# Patient Record
Sex: FEMALE | Race: WHITE | NOT HISPANIC OR LATINO | Employment: UNEMPLOYED | URBAN - METROPOLITAN AREA
[De-identification: names, ages, dates, MRNs, and addresses within clinical notes are randomized per-mention and may not be internally consistent; named-entity substitution may affect disease eponyms.]

---

## 2018-07-23 ENCOUNTER — HOSPITAL ENCOUNTER (EMERGENCY)
Facility: HOSPITAL | Age: 43
Discharge: HOME | End: 2018-07-23
Attending: INTERNAL MEDICINE
Payer: MEDICAID

## 2018-07-23 VITALS
SYSTOLIC BLOOD PRESSURE: 99 MMHG | OXYGEN SATURATION: 100 % | DIASTOLIC BLOOD PRESSURE: 70 MMHG | WEIGHT: 137.38 LBS | RESPIRATION RATE: 16 BRPM | HEART RATE: 74 BPM | TEMPERATURE: 99 F

## 2018-07-23 DIAGNOSIS — M67.432 GANGLION CYST OF WRIST, LEFT: Primary | ICD-10-CM

## 2018-07-23 LAB
B-HCG UR QL: NEGATIVE
CTP QC/QA: YES

## 2018-07-23 PROCEDURE — 81025 URINE PREGNANCY TEST: CPT | Performed by: INTERNAL MEDICINE

## 2018-07-23 PROCEDURE — 99283 EMERGENCY DEPT VISIT LOW MDM: CPT

## 2018-07-23 RX ORDER — IBUPROFEN 800 MG/1
800 TABLET ORAL EVERY 8 HOURS PRN
Qty: 30 TABLET | Refills: 0 | Status: SHIPPED | OUTPATIENT
Start: 2018-07-23 | End: 2018-07-23

## 2018-07-23 RX ORDER — IBUPROFEN 800 MG/1
800 TABLET ORAL EVERY 8 HOURS PRN
Qty: 30 TABLET | Refills: 0 | OUTPATIENT
Start: 2018-07-23 | End: 2022-04-15

## 2018-07-24 NOTE — ED PROVIDER NOTES
Encounter Date: 7/23/2018    SCRIBE #1 NOTE: I, Presley De La Torre, am scribing for, and in the presence of,  Dr. Samson . I have scribed the entire note.       History     Chief Complaint   Patient presents with    Wrist Pain     Pt reports left wrist pain for the past few days, states she noticed a bump on her wrist and has pain, denies trauma, hx CTS     Time patient was seen by the provider: 8:34 PM      The patient is a 43 y.o. female with co-morbidities including: carpal tunnel syndrome who presents to the ED with a complaint of wrist pain due to a bump on her left wrist for 3 days.  She notes that she works as a  and denies any trauma to the area of the bump. She rates the pain of this bump as a 5/10.           The history is provided by the patient.     Review of patient's allergies indicates:   Allergen Reactions    Codeine Shortness Of Breath    Penicillins Shortness Of Breath    Latex, natural rubber Rash     Past Medical History:   Diagnosis Date    Fibroids      History reviewed. No pertinent surgical history.  History reviewed. No pertinent family history.  Social History   Substance Use Topics    Smoking status: Current Every Day Smoker    Smokeless tobacco: Never Used    Alcohol use Yes     Review of Systems   Musculoskeletal: Positive for arthralgias.   Skin: Positive for wound (Bump to her left wrist ).   All other systems reviewed and are negative.      Physical Exam     Initial Vitals [07/23/18 1901]   BP Pulse Resp Temp SpO2   125/65 78 16 98.3 °F (36.8 °C) 100 %      MAP       --         Physical Exam    Nursing note and vitals reviewed.  Constitutional: She appears well-developed and well-nourished.   HENT:   Head: Normocephalic and atraumatic.   Nose: Nose normal.   Eyes: Conjunctivae are normal.   Neck: Normal range of motion. Neck supple.   Cardiovascular: Normal rate and regular rhythm. Exam reveals no gallop and no friction rub.    No murmur heard.  Pulmonary/Chest: No  respiratory distress. She has no wheezes.   Abdominal: She exhibits no distension.   Musculoskeletal: Normal range of motion.   Neurological: She is alert and oriented to person, place, and time.   Skin: Skin is warm and dry.   1 cm diameter cyst to the left lateral wrist.   Nontender to palpation   No erythema.    Psychiatric: She has a normal mood and affect. Her behavior is normal.         ED Course   Procedures  Labs Reviewed   POCT URINE PREGNANCY          Imaging Results    None          Medical Decision Making:   Initial Assessment:   Time patient was seen by the provider: 8:34 PM      The patient is a 43 y.o. female with co-morbidities including: carpal tunnel syndrome who presents to the ED with a complaint of wrist pain due to a bump on her left wrist for 3 days.  She notes that she works as a  and denies any trauma to the area of the bump. She rates the pain of this bump as a 5/10.       Clinical Tests:   Lab Tests: Ordered and Reviewed  ED Management:  Patient was given instructions for ganglion cyst and a prescription for ibuprofen.  She was advised to follow-up with her primary care physician within the next week for reevaluation and to return to the emergency department if condition worsens.            Scribe Attestation:   Scribe #1: I performed the above scribed service and the documentation accurately describes the services I performed. I attest to the accuracy of the note.        This document was produced by a scribe under my direction and in my presence. I agree with the content of the note and have made any necessary edits.     Dr. Samson    07/23/2018 8:47 PM          Clinical Impression:     1. Ganglion cyst of wrist, left            Disposition:   Disposition: Discharged  Condition: Stable                        Jim Samson MD  07/23/18 2050

## 2019-07-10 ENCOUNTER — HOSPITAL ENCOUNTER (EMERGENCY)
Facility: HOSPITAL | Age: 44
Discharge: HOME | End: 2019-07-10
Attending: EMERGENCY MEDICINE
Payer: MEDICAID

## 2019-07-10 VITALS
HEIGHT: 66 IN | BODY MASS INDEX: 23.3 KG/M2 | DIASTOLIC BLOOD PRESSURE: 66 MMHG | RESPIRATION RATE: 17 BRPM | WEIGHT: 145 LBS | TEMPERATURE: 99 F | OXYGEN SATURATION: 97 % | SYSTOLIC BLOOD PRESSURE: 105 MMHG | HEART RATE: 81 BPM

## 2019-07-10 DIAGNOSIS — K08.89 PAIN, DENTAL: Primary | ICD-10-CM

## 2019-07-10 DIAGNOSIS — K04.01 ACUTE PULPITIS: ICD-10-CM

## 2019-07-10 LAB
B-HCG UR QL: NEGATIVE
CTP QC/QA: YES

## 2019-07-10 PROCEDURE — 81025 URINE PREGNANCY TEST: CPT | Performed by: NURSE PRACTITIONER

## 2019-07-10 PROCEDURE — 99283 EMERGENCY DEPT VISIT LOW MDM: CPT

## 2019-07-10 PROCEDURE — 25000003 PHARM REV CODE 250: Performed by: NURSE PRACTITIONER

## 2019-07-10 RX ORDER — CLINDAMYCIN HYDROCHLORIDE 150 MG/1
450 CAPSULE ORAL EVERY 8 HOURS
Qty: 90 CAPSULE | Refills: 0 | Status: SHIPPED | OUTPATIENT
Start: 2019-07-10 | End: 2019-07-20

## 2019-07-10 RX ORDER — CLINDAMYCIN HYDROCHLORIDE 150 MG/1
450 CAPSULE ORAL
Status: COMPLETED | OUTPATIENT
Start: 2019-07-10 | End: 2019-07-10

## 2019-07-10 RX ADMIN — CLINDAMYCIN HYDROCHLORIDE 450 MG: 150 CAPSULE ORAL at 04:07

## 2019-07-10 NOTE — DISCHARGE INSTRUCTIONS
It appears you have a dental infection.    Please take CLINDAMYCIN for the infection for 10 days.    Please follow up with a dentist within 1 week. Call for an appointment as soon as possible.  If you do not have a dentist, refer to the dental resources page for nearby clinics.    Return to the ER for any new or worsening symptoms or concerns.

## 2019-07-10 NOTE — ED PROVIDER NOTES
Encounter Date: 7/10/2019    SCRIBE #1 NOTE: I, Praveena Bolton, am scribing for, and in the presence of,  Ita Son NP. I have scribed the following portions of the note - Other sections scribed: HPI,ROS,PE.       History     Chief Complaint   Patient presents with    Dental Problem     Pt reports she has an abscess on the bottom R side of her jaw. Pt states she has a broken tooth at that site.      CC: Dental pain    HPI: This is a 44 y.o.female patient, with no pertinent PMHx , presenting to the ED with a complaint of right sided dental pain, that began x4 days ago. Patient reports feeling swelling this morning when she woke up. Patient states having multiple cracked teeth to her right lower mandible for months that began to bother her x3 days ago. She reports attempting prior Tx with over the counter pain medication,  warm compresses, salt water rinses, with relief. Patient denies any choking, dyspahgia, fever, chills, shortness of breath, chest pain, neck pain, back pain, abdominal pain, headaches, cough, sore throat, nausea, vomiting, diarrhea, dysuria, or any other associated symptoms. No alleviating or aggravating factors. No known drug allergies.    The history is provided by the patient. No  was used.     Review of patient's allergies indicates:   Allergen Reactions    Codeine Shortness Of Breath and Hives    Iodine Shortness Of Breath    Penicillins Shortness Of Breath and Anaphylaxis    Shellfish containing products Shortness Of Breath and Swelling    Latex Rash    Latex, natural rubber Rash     Past Medical History:   Diagnosis Date    Fibroids      Past Surgical History:   Procedure Laterality Date    TONSILLECTOMY       History reviewed. No pertinent family history.  Social History     Tobacco Use    Smoking status: Former Smoker     Types: Cigarettes     Last attempt to quit: 2/10/2019     Years since quittin.4    Smokeless tobacco: Never Used   Substance Use Topics     Alcohol use: Not Currently    Drug use: No     Review of Systems   Constitutional: Negative for chills and fever.   HENT: Positive for dental problem. Negative for congestion, ear pain, rhinorrhea, sore throat and trouble swallowing.    Eyes: Negative for pain and visual disturbance.   Respiratory: Negative for cough and shortness of breath.    Cardiovascular: Negative for chest pain.   Gastrointestinal: Negative for abdominal pain, diarrhea, nausea and vomiting.   Genitourinary: Negative for dysuria.   Musculoskeletal: Negative for back pain and neck pain.   Skin: Negative for rash.   Neurological: Negative for headaches.       Physical Exam     Initial Vitals [07/10/19 1545]   BP Pulse Resp Temp SpO2   117/73 90 18 98.6 °F (37 °C) 100 %      MAP       --         Physical Exam    Constitutional: Vital signs are normal. She appears well-developed and well-nourished. She is not diaphoretic. She is active and cooperative.  Non-toxic appearance. No distress.   HENT:   Head: Normocephalic and atraumatic.   Nose: Nose normal.   Mouth/Throat: Uvula is midline, oropharynx is clear and moist and mucous membranes are normal. No trismus in the jaw. Abnormal dentition. No uvula swelling.       Molars 31 and 32 are cracked and eroded to gumline.  Premolar 30 has a partial crack and is tender to percussion with underlying gingival swelling and erythema. Mild mandibular swelling appreciated.  No sublingual swelling, facial cellulitis, fluctuant abscess, trismus, drooling.   Eyes: EOM are normal. Pupils are equal, round, and reactive to light.   Neck: Normal range of motion and full passive range of motion without pain. Neck supple.   Cardiovascular: Normal rate, regular rhythm and normal heart sounds. Exam reveals no gallop and no friction rub.    No murmur heard.  Pulmonary/Chest: Breath sounds normal. No respiratory distress. She has no wheezes. She has no rhonchi. She has no rales.   Abdominal: Soft. Bowel sounds are  normal. There is no tenderness. There is no rebound and no guarding.   Musculoskeletal: Normal range of motion.   Neurological: She is alert and oriented to person, place, and time. She has normal strength. No cranial nerve deficit or sensory deficit.   Skin: Skin is warm and dry. Capillary refill takes less than 2 seconds.   Psychiatric: She has a normal mood and affect.         ED Course   Procedures  Labs Reviewed   POCT URINE PREGNANCY          Imaging Results    None          Medical Decision Making:   Differential Diagnosis:   Dental infection, cracked tooth, pulpitis  ED Management:  This is an urgent evaluation of a 43 y/o female that presents to the ED with dental pain.  SHe appears to have pulpitis, possibly a periapical abscess/dental infection.  Pt is afebrile, non-toxic in appearance; no signs of Lucas's angina, airway compromise, facial cellulitis/swelling, sepsis, dehydration, or a fluctuant abscess to drain.  Will discharge with Clindamycin (PCN allergy).  Given return precautions and instructed to follow up with a dentist within a week.                        Clinical Impression:       ICD-10-CM ICD-9-CM   1. Pain, dental K08.89 525.9   2. Acute pulpitis K04.01 522.0         Disposition:   Disposition: Discharged  Condition: Stable           Scribe attestation: I, Ita Ramírez, personally performed the services described in this documentation. All medical record entries made by the scribe were at my direction and in my presence.  I have reviewed the chart and agree that the record reflects my personal performance and is accurate and complete.             Ita Ramírez NP  07/10/19 4418

## 2019-07-10 NOTE — ED TRIAGE NOTES
Pt reports abscess to R bottom jaw since Monday night. Pt reports using oral gel and salt gargle with some relief. Pt c/o of throbbing to R side of face. Pt denies radiation of pain.

## 2022-04-10 ENCOUNTER — HOSPITAL ENCOUNTER (EMERGENCY)
Facility: HOSPITAL | Age: 47
Discharge: HOME | End: 2022-04-11
Attending: EMERGENCY MEDICINE
Payer: MEDICAID

## 2022-04-10 DIAGNOSIS — R53.83 FATIGUE, UNSPECIFIED TYPE: Primary | ICD-10-CM

## 2022-04-10 DIAGNOSIS — D50.0 IRON DEFICIENCY ANEMIA DUE TO CHRONIC BLOOD LOSS: ICD-10-CM

## 2022-04-10 DIAGNOSIS — M79.652 LEFT THIGH PAIN: ICD-10-CM

## 2022-04-10 LAB
ALBUMIN SERPL-MCNC: 4.3 G/DL (ref 3.3–5.5)
ALP SERPL-CCNC: 62 U/L (ref 42–141)
B-HCG UR QL: NEGATIVE
BILIRUB SERPL-MCNC: 0.6 MG/DL (ref 0.2–1.6)
BILIRUBIN, POC UA: NEGATIVE
BLOOD, POC UA: ABNORMAL
BUN SERPL-MCNC: 7 MG/DL (ref 7–22)
CALCIUM SERPL-MCNC: 10.5 MG/DL (ref 8–10.3)
CHLORIDE SERPL-SCNC: 101 MMOL/L (ref 98–108)
CLARITY, POC UA: ABNORMAL
COLOR, POC UA: ABNORMAL
CREAT SERPL-MCNC: 0.6 MG/DL (ref 0.6–1.2)
CTP QC/QA: YES
GLUCOSE SERPL-MCNC: 113 MG/DL (ref 73–118)
GLUCOSE, POC UA: NEGATIVE
KETONES, POC UA: NEGATIVE
LEUKOCYTE EST, POC UA: ABNORMAL
NITRITE, POC UA: NEGATIVE
PH UR STRIP: 6.5 [PH]
POC ALT (SGPT): 14 U/L (ref 10–47)
POC AST (SGOT): 23 U/L (ref 11–38)
POC TCO2: 27 MMOL/L (ref 18–33)
POTASSIUM BLD-SCNC: 3.6 MMOL/L (ref 3.6–5.1)
PROTEIN, POC UA: NEGATIVE
PROTEIN, POC: 8 G/DL (ref 6.4–8.1)
SODIUM BLD-SCNC: 142 MMOL/L (ref 128–145)
SPECIFIC GRAVITY, POC UA: 1.01
UROBILINOGEN, POC UA: 0.2 E.U./DL

## 2022-04-10 PROCEDURE — 85025 COMPLETE CBC W/AUTO DIFF WBC: CPT | Mod: ER

## 2022-04-10 PROCEDURE — 25000003 PHARM REV CODE 250: Mod: ER | Performed by: NURSE PRACTITIONER

## 2022-04-10 PROCEDURE — 96360 HYDRATION IV INFUSION INIT: CPT | Mod: ER

## 2022-04-10 PROCEDURE — 81003 URINALYSIS AUTO W/O SCOPE: CPT | Mod: ER

## 2022-04-10 PROCEDURE — 80053 COMPREHEN METABOLIC PANEL: CPT | Mod: ER

## 2022-04-10 PROCEDURE — 99285 EMERGENCY DEPT VISIT HI MDM: CPT | Mod: 25,ER

## 2022-04-10 PROCEDURE — 81025 URINE PREGNANCY TEST: CPT | Mod: ER | Performed by: STUDENT IN AN ORGANIZED HEALTH CARE EDUCATION/TRAINING PROGRAM

## 2022-04-10 RX ORDER — DOCUSATE SODIUM 100 MG/1
100 CAPSULE, LIQUID FILLED ORAL 2 TIMES DAILY
Qty: 60 CAPSULE | Refills: 0 | Status: ON HOLD | OUTPATIENT
Start: 2022-04-10 | End: 2022-07-05 | Stop reason: HOSPADM

## 2022-04-10 RX ADMIN — SODIUM CHLORIDE 1000 ML: 0.9 INJECTION, SOLUTION INTRAVENOUS at 09:04

## 2022-04-11 VITALS
OXYGEN SATURATION: 100 % | HEART RATE: 77 BPM | TEMPERATURE: 98 F | RESPIRATION RATE: 16 BRPM | WEIGHT: 144 LBS | BODY MASS INDEX: 23.24 KG/M2 | DIASTOLIC BLOOD PRESSURE: 66 MMHG | SYSTOLIC BLOOD PRESSURE: 135 MMHG

## 2022-04-11 RX ORDER — METHOCARBAMOL 750 MG/1
1500 TABLET, FILM COATED ORAL EVERY 6 HOURS
Qty: 24 TABLET | Refills: 0 | Status: SHIPPED | OUTPATIENT
Start: 2022-04-11 | End: 2022-04-14

## 2022-04-11 NOTE — DISCHARGE INSTRUCTIONS
Continue iron supplements as previously prescribed.  Drink plenty of fluids.  Limit/avoid caffeine and alcohol intake while symptoms persist.

## 2022-04-11 NOTE — ED PROVIDER NOTES
"Encounter Date: 4/10/2022    SCRIBE #1 NOTE: I, Rosemarie Sanchez, am scribing for, and in the presence of,  Ry Sampson MD. I have scribed the following portions of the note - Other sections scribed: HPI; ROS; PE.       History     Chief Complaint   Patient presents with    Weakness     Pt states," I am lite headed, I was at [facility] and they said I had anemia."     Milagro Gomez is a 46 y.o. female with Anemia, who presents to the ED for chief complaint of chronic lightheadedness and fatigue onset 1 month ago and chronic, constant left leg pain onset 3 weeks ago. Patient reports she is "severely anemic," and "can't function right." She states she is taking iron supplements two times daily. Patient notes started her menstrual cycle on 04/03 and is still currently bleeding, but she normally bleeds for 7 days. She reports she changes her pad 2-3 times a day. Patient states she had an appointment with her primary care physician on 04/07 for her anemia. She reports previous issues with diarrhea, but has since stopped due to the iron supplements. She notes her last bowel movement was today. Patient reports she has a history of back pain. She denies any recent injury or accident to the left lower extremity. Patient notes she is short of breath at night. She denies vomiting, chest pain, or hematochezia. She reports tobacco abuse.      The history is provided by the patient. No  was used.     Review of patient's allergies indicates:   Allergen Reactions    Codeine Shortness Of Breath and Hives    Iodine Shortness Of Breath    Penicillins Shortness Of Breath and Anaphylaxis    Shellfish containing products Shortness Of Breath and Swelling    Latex Rash    Latex, natural rubber Rash     Past Medical History:   Diagnosis Date    Fibroids      Past Surgical History:   Procedure Laterality Date    TONSILLECTOMY       No family history on file.  Social History     Tobacco Use    Smoking " status: Former Smoker     Types: Cigarettes     Quit date: 2/10/2019     Years since quitting: 3.2    Smokeless tobacco: Never Used   Substance Use Topics    Alcohol use: Not Currently    Drug use: No     Review of Systems   Constitutional: Positive for fatigue.   Respiratory: Positive for shortness of breath.    Cardiovascular: Negative for chest pain.   Gastrointestinal: Negative for blood in stool and vomiting.   Genitourinary: Negative for menstrual problem.   Musculoskeletal: Positive for myalgias.   Neurological: Positive for light-headedness. Negative for syncope.   All other systems reviewed and are negative.      Physical Exam     Initial Vitals [04/10/22 1843]   BP Pulse Resp Temp SpO2   (!) 155/91 88 16 98.4 °F (36.9 °C) 100 %      MAP       --         Physical Exam    Nursing note and vitals reviewed.  Constitutional: She appears well-developed and well-nourished. She is not diaphoretic. She is cooperative. She does not have a sickly appearance. No distress.   HENT:   Head: Normocephalic and atraumatic.   Eyes: Conjunctivae are normal. Pupils are equal, round, and reactive to light.   Neck: Neck supple.   Normal range of motion.  Cardiovascular: Normal rate, regular rhythm and intact distal pulses.   Pulses:       Dorsalis pedis pulses are 2+ on the right side and 2+ on the left side.   Pulmonary/Chest: Effort normal and breath sounds normal. No accessory muscle usage. No tachypnea. No respiratory distress.   Abdominal: Abdomen is soft. She exhibits no distension. There is no abdominal tenderness. There is no rebound and no guarding.   Musculoskeletal:         General: No tenderness. Normal range of motion.      Cervical back: Normal range of motion and neck supple.     Neurological: She is alert and oriented to person, place, and time. She has normal strength. Gait normal. GCS eye subscore is 4. GCS verbal subscore is 5. GCS motor subscore is 6.   Skin: Skin is warm. Capillary refill takes less than  2 seconds.   Psychiatric: Her affect is blunt. She exhibits a depressed mood.         ED Course   Procedures  Labs Reviewed   POCT URINALYSIS W/O SCOPE - Abnormal; Notable for the following components:       Result Value    Blood, UA 3+ (*)     Leukocytes, UA Trace (*)     All other components within normal limits   POCT CMP - Abnormal; Notable for the following components:    Calcium, POC 10.5 (*)     All other components within normal limits   POCT URINE PREGNANCY   POCT CBC   POCT URINALYSIS W/O SCOPE   POCT CMP          Imaging Results          US Lower Extremity Veins Left (Final result)  Result time 04/11/22 00:05:50    Final result by Roland Marks MD (04/11/22 00:05:50)                 Impression:      No evidence of deep venous thrombosis in the left lower extremity.      Electronically signed by: Roland Marks  Date:    04/11/2022  Time:    00:05             Narrative:    EXAMINATION:  US LOWER EXTREMITY VEINS LEFT    CLINICAL HISTORY:  Pain in left thigh    TECHNIQUE:  Duplex and color flow Doppler evaluation and graded compression of the left lower extremity veins was performed.    COMPARISON:  None    FINDINGS:  Left thigh veins: The common femoral, femoral, popliteal, upper greater saphenous, and deep femoral veins are patent and free of thrombus. The veins are normally compressible and have normal phasic flow and augmentation response.    Left calf veins: The visualized calf veins are patent.    Contralateral CFV: The contralateral (right) common femoral vein is patent and free of thrombus.    Miscellaneous: None                                 Medications   sodium chloride 0.9% bolus 1,000 mL (0 mLs Intravenous Stopped 4/10/22 5654)     Labs Reviewed            Admission on 04/10/2022, Discharged on 04/11/2022   Component Date Value Ref Range Status    POC Preg Test, Ur 04/10/2022 Negative  Negative Final     Acceptable 04/10/2022 Yes   Final    Albumin, POC 04/10/2022 4.3   3.3 - 5.5 g/dL Final    Alkaline Phosphatase, POC 04/10/2022 62  42 - 141 U/L Final    ALT (SGPT), POC 04/10/2022 14  10 - 47 U/L Final    AST (SGOT), POC 04/10/2022 23  11 - 38 U/L Final    POC BUN 04/10/2022 7  7 - 22 mg/dL Final    Calcium, POC 04/10/2022 10.5 (A) 8.0 - 10.3 mg/dL Final    POC Chloride 04/10/2022 101  98 - 108 mmol/L Final    POC Creatinine 04/10/2022 0.6  0.6 - 1.2 mg/dL Final    POC Glucose 04/10/2022 113  73 - 118 mg/dL Final    POC Potassium 04/10/2022 3.6  3.6 - 5.1 mmol/L Final    POC Sodium 04/10/2022 142  128 - 145 mmol/L Final    Bilirubin, POC 04/10/2022 0.6  0.2 - 1.6 mg/dL Final    POC TCO2 04/10/2022 27  18 - 33 mmol/L Final    Protein, POC 04/10/2022 8.0  6.4 - 8.1 g/dL Final    Glucose, UA 04/10/2022 Negative   Final    Bilirubin, UA 04/10/2022 Negative   Final    Ketones, UA 04/10/2022 Negative   Final    Spec Grav UA 04/10/2022 1.010   Final    Blood, UA 04/10/2022 3+ (A)  Final    PH, UA 04/10/2022 6.5   Final    Protein, UA 04/10/2022 Negative   Final    Urobilinogen, UA 04/10/2022 0.2  E.U./dL Final    Nitrite, UA 04/10/2022 Negative   Final    Leukocytes, UA 04/10/2022 Trace (A)  Final    Color, UA 04/10/2022 Pink   Final    Clarity, UA 04/10/2022 Slightly Cloudy   Final        Imaging Reviewed    Imaging Results          US Lower Extremity Veins Left (Final result)  Result time 04/11/22 00:05:50    Final result by Roland Marks MD (04/11/22 00:05:50)                 Impression:      No evidence of deep venous thrombosis in the left lower extremity.      Electronically signed by: Roland Marks  Date:    04/11/2022  Time:    00:05             Narrative:    EXAMINATION:  US LOWER EXTREMITY VEINS LEFT    CLINICAL HISTORY:  Pain in left thigh    TECHNIQUE:  Duplex and color flow Doppler evaluation and graded compression of the left lower extremity veins was performed.    COMPARISON:  None    FINDINGS:  Left thigh veins: The common femoral, femoral,  popliteal, upper greater saphenous, and deep femoral veins are patent and free of thrombus. The veins are normally compressible and have normal phasic flow and augmentation response.    Left calf veins: The visualized calf veins are patent.    Contralateral CFV: The contralateral (right) common femoral vein is patent and free of thrombus.    Miscellaneous: None                                Medications given in ED    Medications   sodium chloride 0.9% bolus 1,000 mL (0 mLs Intravenous Stopped 4/10/22 0604)         Note was created using voice recognition software. Note may have occasional typographical errors that may not have been identified and edited despite good shen initial review prior to signing.    I, Ry Sampson MD, personally performed the services described in this documentation. All medical record entries made by the scribe were at my direction and in my presence.  I have reviewed the chart and agree that the record reflects my personal performance and is accurate and complete.      Medical Decision Making:   History:   Old Medical Records: I decided to obtain old medical records.  Clinical Tests:   Lab Tests: Ordered and Reviewed  Radiological Study: Ordered and Reviewed          Scribe Attestation:   Scribe #1: I performed the above scribed service and the documentation accurately describes the services I performed. I attest to the accuracy of the note.        ED Course as of 05/07/22 0245   Sun Apr 10, 2022   2153 H/H from 4/8/22 8.2/30.5 [DL]      ED Course User Index  [DL] Ry Sampson MD             Clinical Impression:   Final diagnoses:  [R53.83] Fatigue, unspecified type (Primary)  [D50.0] Iron deficiency anemia due to chronic blood loss  [M79.652] Left thigh pain          ED Disposition Condition    Discharge Stable        ED Prescriptions     Medication Sig Dispense Start Date End Date Auth. Provider    docusate sodium (COLACE) 100 MG capsule Take 1 capsule (100 mg total) by mouth 2  (two) times daily. Take with each dose of iron to prevent constipation 60 capsule 4/10/2022  Ry Sampson MD    methocarbamoL (ROBAXIN) 750 MG Tab () Take 2 tablets (1,500 mg total) by mouth every 6 (six) hours. for 3 days 24 tablet 2022 Ry Sampson MD        Follow-up Information     Follow up With Specialties Details Why Contact Info    Rickie Causey MD General Practice Call  As needed, for ongoing care [address] [facility] [address] [phone]      The nearest emergency department.  Go to  As needed, If symptoms worsen            Ry Sampson MD  [phone]

## 2022-04-11 NOTE — FIRST PROVIDER EVALUATION
Medical screening exam completed.  I have conducted a focused provider triage encounter, findings are as follows:    Brief history of present illness:  Anemia, increased weakness, lightheadedness; also has a UTI on Bactrim    Vitals:    04/10/22 1843   BP: (!) 155/91   BP Location: Right arm   Patient Position: Sitting   Pulse: 88   Resp: 16   Temp: 98.4 °F (36.9 °C)   TempSrc: Oral   SpO2: 100%   Weight: 65.3 kg (144 lb)       Pertinent physical exam: NAD    Brief workup plan:  Labs, orthostatics    Preliminary workup initiated; this workup will be continued and followed by the physician or advanced practice provider that is assigned to the patient when roomed.

## 2022-04-14 ENCOUNTER — HOSPITAL ENCOUNTER (EMERGENCY)
Facility: HOSPITAL | Age: 47
Discharge: HOME | End: 2022-04-14
Attending: EMERGENCY MEDICINE
Payer: MEDICAID

## 2022-04-14 VITALS
OXYGEN SATURATION: 100 % | RESPIRATION RATE: 19 BRPM | HEIGHT: 66 IN | BODY MASS INDEX: 23.3 KG/M2 | HEART RATE: 81 BPM | SYSTOLIC BLOOD PRESSURE: 123 MMHG | TEMPERATURE: 99 F | DIASTOLIC BLOOD PRESSURE: 64 MMHG | WEIGHT: 145 LBS

## 2022-04-14 DIAGNOSIS — D64.9 ANEMIA, UNSPECIFIED TYPE: ICD-10-CM

## 2022-04-14 DIAGNOSIS — R42 ORTHOSTATIC DIZZINESS: Primary | ICD-10-CM

## 2022-04-14 DIAGNOSIS — R53.83 FATIGUE: ICD-10-CM

## 2022-04-14 LAB
ALBUMIN SERPL BCP-MCNC: 3.9 G/DL (ref 3.5–5.2)
ALP SERPL-CCNC: 51 U/L (ref 55–135)
ALT SERPL W/O P-5'-P-CCNC: 8 U/L (ref 10–44)
ANION GAP SERPL CALC-SCNC: 8 MMOL/L (ref 8–16)
AST SERPL-CCNC: 12 U/L (ref 10–40)
BACTERIA #/AREA URNS HPF: NORMAL /HPF
BASOPHILS # BLD AUTO: 0.09 K/UL (ref 0–0.2)
BASOPHILS NFR BLD: 0.9 % (ref 0–1.9)
BILIRUB SERPL-MCNC: 0.3 MG/DL (ref 0.1–1)
BILIRUB UR QL STRIP: NEGATIVE
BUN SERPL-MCNC: 7 MG/DL (ref 6–20)
CALCIUM SERPL-MCNC: 8.9 MG/DL (ref 8.7–10.5)
CHLORIDE SERPL-SCNC: 100 MMOL/L (ref 95–110)
CLARITY UR: CLEAR
CO2 SERPL-SCNC: 24 MMOL/L (ref 23–29)
COLOR UR: COLORLESS
CREAT SERPL-MCNC: 0.9 MG/DL (ref 0.5–1.4)
DIFFERENTIAL METHOD: ABNORMAL
EOSINOPHIL # BLD AUTO: 0.1 K/UL (ref 0–0.5)
EOSINOPHIL NFR BLD: 1.3 % (ref 0–8)
ERYTHROCYTE [DISTWIDTH] IN BLOOD BY AUTOMATED COUNT: 25 % (ref 11.5–14.5)
EST. GFR  (AFRICAN AMERICAN): >60 ML/MIN/1.73 M^2
EST. GFR  (NON AFRICAN AMERICAN): >60 ML/MIN/1.73 M^2
GLUCOSE SERPL-MCNC: 75 MG/DL (ref 70–110)
GLUCOSE UR QL STRIP: NEGATIVE
HCT VFR BLD AUTO: 30.1 % (ref 37–48.5)
HGB BLD-MCNC: 8.4 G/DL (ref 12–16)
HGB UR QL STRIP: ABNORMAL
IMM GRANULOCYTES # BLD AUTO: 0.04 K/UL (ref 0–0.04)
IMM GRANULOCYTES NFR BLD AUTO: 0.4 % (ref 0–0.5)
KETONES UR QL STRIP: NEGATIVE
LEUKOCYTE ESTERASE UR QL STRIP: ABNORMAL
LIPASE SERPL-CCNC: 21 U/L (ref 4–60)
LYMPHOCYTES # BLD AUTO: 2.5 K/UL (ref 1–4.8)
LYMPHOCYTES NFR BLD: 24.2 % (ref 18–48)
MCH RBC QN AUTO: 18.9 PG (ref 27–31)
MCHC RBC AUTO-ENTMCNC: 27.9 G/DL (ref 32–36)
MCV RBC AUTO: 68 FL (ref 82–98)
MICROSCOPIC COMMENT: NORMAL
MONOCYTES # BLD AUTO: 0.9 K/UL (ref 0.3–1)
MONOCYTES NFR BLD: 9.2 % (ref 4–15)
NEUTROPHILS # BLD AUTO: 6.5 K/UL (ref 1.8–7.7)
NEUTROPHILS NFR BLD: 64 % (ref 38–73)
NITRITE UR QL STRIP: NEGATIVE
NRBC BLD-RTO: 0 /100 WBC
PH UR STRIP: 6 [PH] (ref 5–8)
PLATELET # BLD AUTO: 369 K/UL (ref 150–450)
PMV BLD AUTO: 10.9 FL (ref 9.2–12.9)
POCT GLUCOSE: 86 MG/DL (ref 70–110)
POTASSIUM SERPL-SCNC: 3.9 MMOL/L (ref 3.5–5.1)
PROT SERPL-MCNC: 7.1 G/DL (ref 6–8.4)
PROT UR QL STRIP: NEGATIVE
RBC # BLD AUTO: 4.44 M/UL (ref 4–5.4)
RBC #/AREA URNS HPF: 0 /HPF (ref 0–4)
SODIUM SERPL-SCNC: 132 MMOL/L (ref 136–145)
SP GR UR STRIP: <1.005 (ref 1–1.03)
TROPONIN I SERPL DL<=0.01 NG/ML-MCNC: <0.006 NG/ML (ref 0–0.03)
URN SPEC COLLECT METH UR: ABNORMAL
UROBILINOGEN UR STRIP-ACNC: NEGATIVE EU/DL
WBC # BLD AUTO: 10.11 K/UL (ref 3.9–12.7)
WBC #/AREA URNS HPF: 1 /HPF (ref 0–5)

## 2022-04-14 PROCEDURE — 80053 COMPREHEN METABOLIC PANEL: CPT | Performed by: EMERGENCY MEDICINE

## 2022-04-14 PROCEDURE — 93010 EKG 12-LEAD: ICD-10-PCS | Mod: ,,, | Performed by: INTERNAL MEDICINE

## 2022-04-14 PROCEDURE — 25000003 PHARM REV CODE 250: Performed by: EMERGENCY MEDICINE

## 2022-04-14 PROCEDURE — 84484 ASSAY OF TROPONIN QUANT: CPT | Performed by: EMERGENCY MEDICINE

## 2022-04-14 PROCEDURE — 82962 GLUCOSE BLOOD TEST: CPT

## 2022-04-14 PROCEDURE — 93010 ELECTROCARDIOGRAM REPORT: CPT | Mod: ,,, | Performed by: INTERNAL MEDICINE

## 2022-04-14 PROCEDURE — 81000 URINALYSIS NONAUTO W/SCOPE: CPT | Performed by: EMERGENCY MEDICINE

## 2022-04-14 PROCEDURE — 99284 EMERGENCY DEPT VISIT MOD MDM: CPT | Mod: 25

## 2022-04-14 PROCEDURE — 96360 HYDRATION IV INFUSION INIT: CPT

## 2022-04-14 PROCEDURE — 85025 COMPLETE CBC W/AUTO DIFF WBC: CPT | Performed by: EMERGENCY MEDICINE

## 2022-04-14 PROCEDURE — 83690 ASSAY OF LIPASE: CPT | Performed by: EMERGENCY MEDICINE

## 2022-04-14 PROCEDURE — 96361 HYDRATE IV INFUSION ADD-ON: CPT

## 2022-04-14 PROCEDURE — 93005 ELECTROCARDIOGRAM TRACING: CPT

## 2022-04-14 RX ADMIN — SODIUM CHLORIDE 1000 ML: 0.9 INJECTION, SOLUTION INTRAVENOUS at 07:04

## 2022-04-14 RX ADMIN — SODIUM CHLORIDE 1000 ML: 0.9 INJECTION, SOLUTION INTRAVENOUS at 05:04

## 2022-04-14 NOTE — ED TRIAGE NOTES
Pt. Complains of weakness that started months ago that became worse on yesterday. Pt. Complains of abd and lower back that started approximately 1 month ago. Pt. States that she had been taking bactrim for a bladder infection which caused diarrhea. Pt. States that her doctor told her to stop taking the bactrim because of the diarrhea. Pt. Rates her pain at a 7/10 on the pain scale.

## 2022-04-14 NOTE — ED NOTES
Orthostatic Vitals    17:11 - Lying /60 HR 71  17:14 - Sitting BP 96/61  HR 83  17:17 - Standing BP 96/58 HR 96

## 2022-04-14 NOTE — ED PROVIDER NOTES
Encounter Date: 4/14/2022    SCRIBE #1 NOTE: I, Ebenezer Mensah, iván scribing for, and in the presence of, Bryce Beasley MD.       History     Chief Complaint   Patient presents with    Weakness     Pt to ED via [facility] c/c weakness and dizziness x several months. Pt was at appt to get blood work when she started feeling light-headed and facility called 911. Pt states she currently has a bladder infection and is taking Bactrim. Pt states she took it last night but has been having diarrhea with it so her MD told her to stop taking it. Pt also states she has not been eating a lot.     Time seen by provider: 4:54 PM    This is a 46 y.o. female with chronic anemia who presents with complaint of lightheadedness occurring while at an appointment for blood work today. She was checking in to see Dr. Saldivar when this began, accompanied by general weakness and diffuse chest pains. The chest pain resolved within minutes and was not accompanied by any sweating, nausea, or other symptoms. She does recall prior instance of similar chest pain familiar to her anxiety. Patient adds that she has not been eating well due to gagging from congestion and acid reflux. She also reports a recent UTI and was given Bactrim, which she took for three to four days. However, she ceased this today after having more than one episode of diarrhea. She reports one episode yesterday and one today. Patient did not note any blood and denies any vomiting. No PMHx of piror blood transfusion. She endorses compliance with her iron supplements. LKMP was earlier this month, and she typically has heavy periods.    The history is provided by the patient.     Review of patient's allergies indicates:   Allergen Reactions    Codeine Shortness Of Breath and Hives    Iodine Shortness Of Breath    Penicillins Shortness Of Breath and Anaphylaxis    Shellfish containing products Shortness Of Breath and Swelling    Latex Rash    Latex, natural rubber Rash     Past  Medical History:   Diagnosis Date    Fibroids      Past Surgical History:   Procedure Laterality Date    TONSILLECTOMY       History reviewed. No pertinent family history.  Social History     Tobacco Use    Smoking status: Former Smoker     Types: Cigarettes     Quit date: 2/10/2019     Years since quitting: 3.2    Smokeless tobacco: Never Used   Substance Use Topics    Alcohol use: Not Currently    Drug use: No     Review of Systems   Constitutional: Negative for diaphoresis and fever.   HENT: Positive for congestion. Negative for sore throat.    Eyes: Negative for visual disturbance.   Respiratory: Negative for shortness of breath.    Cardiovascular: Positive for chest pain (resolved).   Gastrointestinal: Negative for abdominal pain and nausea.   Genitourinary: Negative for dysuria.   Musculoskeletal: Negative for back pain.   Skin: Negative for rash.   Neurological: Positive for weakness and light-headedness. Negative for headaches.       Physical Exam     Initial Vitals [04/14/22 1534]   BP Pulse Resp Temp SpO2   138/82 84 14 98.7 °F (37.1 °C) 100 %      MAP       --         Physical Exam    Nursing note and vitals reviewed.  Constitutional: She appears well-developed and well-nourished.   HENT:   Head: Normocephalic and atraumatic.   Eyes: EOM are normal. Pupils are equal, round, and reactive to light.   Neck: Neck supple. No thyromegaly present. No JVD present.   Normal range of motion.  Cardiovascular: Normal rate and regular rhythm. Exam reveals no gallop and no friction rub.    No murmur heard.  Pulmonary/Chest: Breath sounds normal. No respiratory distress.   Abdominal: Abdomen is soft. Bowel sounds are normal. There is no abdominal tenderness.   Musculoskeletal:         General: No tenderness or edema. Normal range of motion.      Cervical back: Normal range of motion and neck supple.     Neurological: She is alert and oriented to person, place, and time. She has normal strength. GCS score is 15.  GCS eye subscore is 4. GCS verbal subscore is 5. GCS motor subscore is 6.   Skin: Skin is warm and dry. Capillary refill takes less than 2 seconds.         ED Course   Procedures  Labs Reviewed   CBC W/ AUTO DIFFERENTIAL - Abnormal; Notable for the following components:       Result Value    Hemoglobin 8.4 (*)     Hematocrit 30.1 (*)     MCV 68 (*)     MCH 18.9 (*)     MCHC 27.9 (*)     RDW 25.0 (*)     All other components within normal limits   COMPREHENSIVE METABOLIC PANEL - Abnormal; Notable for the following components:    Sodium 132 (*)     Alkaline Phosphatase 51 (*)     ALT 8 (*)     All other components within normal limits   URINALYSIS, REFLEX TO URINE CULTURE - Abnormal; Notable for the following components:    Color, UA Colorless (*)     Specific Gravity, UA <1.005 (*)     Occult Blood UA Trace (*)     Leukocytes, UA Trace (*)     All other components within normal limits    Narrative:     Specimen Source->Urine   LIPASE   TROPONIN I   TROPONIN I   URINALYSIS MICROSCOPIC    Narrative:     Specimen Source->Urine   POCT GLUCOSE     EKG Readings: (Independently Interpreted)   Initial Reading: No STEMI. Rhythm: Normal Sinus Rhythm. Heart Rate: 77 BPM. Ectopy: No Ectopy. Axis: Right Axis Deviation.     ECG Results          EKG 12-lead (Final result)  Result time 04/15/22 17:05:02    Final result by Interface, Lab In [facility] (04/15/22 17:05:02)                 Narrative:    Test Reason : R53.83,    Vent. Rate : 077 BPM     Atrial Rate : 077 BPM     P-R Int : 136 ms          QRS Dur : 088 ms      QT Int : 386 ms       P-R-T Axes : 064 092 068 degrees     QTc Int : 436 ms    Normal sinus rhythm  Rightward axis  Borderline Abnormal ECG  No previous ECGs available  Confirmed by Mahin Ndiaye MD ([id number]) on 4/15/2022 5:04:52 PM    Referred By: [email or web address]   SELF           Confirmed By:Mahin Ndiaye MD                             EKG 12-LEAD (Final result)  Result time 04/22/22 09:24:22    Final result by Unknown User  (04/22/22 09:24:22)                                Imaging Results    None          Medications   sodium chloride 0.9% bolus 1,000 mL (0 mLs Intravenous Stopped 4/14/22 1821)   sodium chloride 0.9% bolus 1,000 mL (0 mLs Intravenous Stopped 4/14/22 2012)     Medical Decision Making:   History:   Old Medical Records: I decided to obtain old medical records.  Initial Assessment:   Per chart review, patient has a history of chronic diarrhea and was evaluated for microcytic anemia and UTI six days ago at [facility] on 4/1, 4/5, and 4/8/2022. At the time, she reported ongoing weakness and already had plans for a colonoscopy with Dr. Thorne. She was treated with lactated ringers and discharged with instructions for compliance with her iron supplements as well as follow-up with primary care for ongoing care as well as her anxiety and depression. Patient was then seen at [facility] three days ago with similar complains and endorsed continues cessation of her iron supplements. CBC and CMP were altogether unremarkable. There were trace leucocytes in her urine as well as blood. Hemoglobin was 9.4 at the time. Patient was discharged on docusate sodium and methocarbamol.  Initial evaluation of a 46 year old female with lightheadedness in the setting of ongoing weakness. Will get orthostatics and added troponin to her workup to rule-out NSTEMI. Plan to give IVF and re-assess.  Differential Diagnosis:   My differential diagnosis includes, but is not limited to medication non-compliance, anemia (iron deficiency vs. blood loss), ACS/MI, electrolyte abnormalities, and decompensated psychiatric disorder (generalized anxiety, major depression).  Clinical Tests:   Lab Tests: Ordered and Reviewed  Medical Tests: Ordered and Reviewed    Anemic but no evidence of acute blood loss.  No indication of blood transfusion this time.  Improved with IV fluids.  Vital signs stable.  Stable for discharge.        Scribe Attestation:   Scribe #1: I performed the  above scribed service and the documentation accurately describes the services I performed. I attest to the accuracy of the note.               Physician Attestation for Scribe: I, Keyana Beasley, reviewed documentation as scribed in my presence, which is both accurate and complete.    Clinical Impression:   Final diagnoses:  [R53.83] Fatigue  [R42] Orthostatic dizziness (Primary)  [D64.9] Anemia, unspecified type          ED Disposition Condition    Discharge Stable        ED Prescriptions     None        Follow-up Information     Follow up With Specialties Details Why Contact Info    Juan Saldivar MD Family Medicine Call in 1 day follow up with your primary care doctor soon. [address] [facility] [address]  [phone]      Radhika Loaiza MD Obstetrics and Gynecology  follow up with your OB/GYN for reevalaution of menstrual periods. If you do not have one call this one. [address]  [address]  [facility] [address]  [phone]             Keyana Beasley MD  05/02/22 2031

## 2022-04-15 ENCOUNTER — HOSPITAL ENCOUNTER (EMERGENCY)
Facility: HOSPITAL | Age: 47
Discharge: HOME | End: 2022-04-15
Attending: EMERGENCY MEDICINE
Payer: MEDICAID

## 2022-04-15 VITALS
DIASTOLIC BLOOD PRESSURE: 59 MMHG | HEIGHT: 66 IN | HEART RATE: 67 BPM | OXYGEN SATURATION: 100 % | WEIGHT: 139 LBS | RESPIRATION RATE: 18 BRPM | BODY MASS INDEX: 22.34 KG/M2 | SYSTOLIC BLOOD PRESSURE: 103 MMHG | TEMPERATURE: 99 F

## 2022-04-15 DIAGNOSIS — R10.30 LOWER ABDOMINAL PAIN: Primary | ICD-10-CM

## 2022-04-15 DIAGNOSIS — R19.5 LOOSE STOOLS: ICD-10-CM

## 2022-04-15 LAB
ALBUMIN SERPL-MCNC: 3.7 G/DL (ref 3.3–5.5)
ALP SERPL-CCNC: 56 U/L (ref 42–141)
B-HCG UR QL: NEGATIVE
BILIRUB SERPL-MCNC: 0.6 MG/DL (ref 0.2–1.6)
BILIRUBIN, POC UA: NEGATIVE
BLOOD, POC UA: ABNORMAL
BUN SERPL-MCNC: 8 MG/DL (ref 7–22)
CALCIUM SERPL-MCNC: 10 MG/DL (ref 8–10.3)
CHLORIDE SERPL-SCNC: 106 MMOL/L (ref 98–108)
CLARITY, POC UA: CLEAR
COLOR, POC UA: YELLOW
CREAT SERPL-MCNC: 0.7 MG/DL (ref 0.6–1.2)
CTP QC/QA: YES
GLUCOSE SERPL-MCNC: 99 MG/DL (ref 73–118)
GLUCOSE, POC UA: NEGATIVE
HCT, POC: NORMAL
HGB, POC: NORMAL (ref 14–18)
KETONES, POC UA: NEGATIVE
LEUKOCYTE EST, POC UA: ABNORMAL
MCH, POC: NORMAL
MCHC, POC: NORMAL
MCV, POC: NORMAL
MPV, POC: NORMAL
NITRITE, POC UA: NEGATIVE
PH UR STRIP: 6 [PH]
POC ALT (SGPT): 14 U/L (ref 10–47)
POC AST (SGOT): 21 U/L (ref 11–38)
POC PLATELET COUNT: NORMAL
POC TCO2: 29 MMOL/L (ref 18–33)
POTASSIUM BLD-SCNC: 4.2 MMOL/L (ref 3.6–5.1)
PROTEIN, POC UA: NEGATIVE
PROTEIN, POC: 7 G/DL (ref 6.4–8.1)
RBC, POC: NORMAL
RDW, POC: NORMAL
SODIUM BLD-SCNC: 144 MMOL/L (ref 128–145)
SPECIFIC GRAVITY, POC UA: 1.01
UROBILINOGEN, POC UA: 0.2 E.U./DL
WBC, POC: NORMAL

## 2022-04-15 PROCEDURE — 81025 URINE PREGNANCY TEST: CPT | Mod: ER | Performed by: NURSE PRACTITIONER

## 2022-04-15 PROCEDURE — 80053 COMPREHEN METABOLIC PANEL: CPT | Mod: ER

## 2022-04-15 PROCEDURE — 99284 EMERGENCY DEPT VISIT MOD MDM: CPT | Mod: 25,ER

## 2022-04-15 PROCEDURE — 96360 HYDRATION IV INFUSION INIT: CPT | Mod: ER

## 2022-04-15 PROCEDURE — 25000003 PHARM REV CODE 250: Mod: ER | Performed by: NURSE PRACTITIONER

## 2022-04-15 PROCEDURE — 87086 URINE CULTURE/COLONY COUNT: CPT | Performed by: NURSE PRACTITIONER

## 2022-04-15 PROCEDURE — 85025 COMPLETE CBC W/AUTO DIFF WBC: CPT | Mod: ER

## 2022-04-15 PROCEDURE — 96361 HYDRATE IV INFUSION ADD-ON: CPT | Mod: ER

## 2022-04-15 RX ORDER — KETOROLAC TROMETHAMINE 30 MG/ML
15 INJECTION, SOLUTION INTRAMUSCULAR; INTRAVENOUS
Status: DISCONTINUED | OUTPATIENT
Start: 2022-04-15 | End: 2022-04-15 | Stop reason: HOSPADM

## 2022-04-15 RX ORDER — NAPROXEN 500 MG/1
500 TABLET ORAL 2 TIMES DAILY WITH MEALS
Qty: 14 TABLET | Refills: 0 | Status: SHIPPED | OUTPATIENT
Start: 2022-04-15 | End: 2022-04-22

## 2022-04-15 RX ADMIN — SODIUM CHLORIDE 1000 ML: 0.9 INJECTION, SOLUTION INTRAVENOUS at 08:04

## 2022-04-16 NOTE — DISCHARGE INSTRUCTIONS
All labs and diagnostics were normal without exception. Naprosyn for lower abd pain attributed to reported history of uterine fibroids. Return to the Emergency Department for any worsening, change in condition, or any emergent concerns.

## 2022-04-16 NOTE — ED PROVIDER NOTES
"Encounter Date: 4/15/2022    SCRIBE #1 NOTE: I, Melquiades Ruiz, am scribing for, and in the presence of,  Michael Hernandez DNP. I have scribed the following portions of the note - Other sections scribed: HPI, ROS, PE.       History     Chief Complaint   Patient presents with    Abdominal Pain     Co abd pain & diarrhea; GI appt scheduled for Tuesday.       46 year old female presents to the emergency department with complaints of abdominal pain onset 3 days ago. She admits to diarrhea (1 episode per day), feeling "anemic", she reports this  Consists of lightheadedness. She denies nausea, vomiting, fever, vaginal bleeding, bloody stool, or vaginal discharge. She reports she has not taken any medicine for her symptoms. She states there are no factors that aggravate or ease her symptoms. There are no other signs or symptoms at this time.    The history is provided by the patient. No  was used.     Review of patient's allergies indicates:   Allergen Reactions    Codeine Shortness Of Breath and Hives    Iodine Shortness Of Breath    Penicillins Shortness Of Breath and Anaphylaxis    Shellfish containing products Shortness Of Breath and Swelling    Latex Rash    Latex, natural rubber Rash     Past Medical History:   Diagnosis Date    Fibroids      Past Surgical History:   Procedure Laterality Date    TONSILLECTOMY       History reviewed. No pertinent family history.  Social History     Tobacco Use    Smoking status: Former Smoker     Types: Cigarettes     Quit date: 2/10/2019     Years since quitting: 3.1    Smokeless tobacco: Never Used   Substance Use Topics    Alcohol use: Not Currently    Drug use: No     Review of Systems   Constitutional: Negative for chills and fever.   HENT: Negative for congestion, ear pain, rhinorrhea, sore throat and trouble swallowing.    Eyes: Negative for pain, discharge and redness.   Respiratory: Positive for shortness of breath. Negative for cough.  " Pt still refusing all cares including temperature monitoring and assessment.  Alert and oriented just irritable.  Refusing all other medications besides infusion at this time as well.  Pt has been spoken to by Dr. Traylor, the nursing supervisor, and Ale Hernandez RN.  Dr. Traylor aware of refusal of medications and cares.     Cardiovascular: Negative for chest pain.   Gastrointestinal: Positive for abdominal pain and diarrhea. Negative for blood in stool, nausea and vomiting.   Genitourinary: Negative for decreased urine volume, dysuria, vaginal bleeding and vaginal discharge.   Musculoskeletal: Positive for arthralgias (body aches). Negative for back pain, neck pain and neck stiffness.   Skin: Negative for rash.   Neurological: Positive for light-headedness. Negative for dizziness, weakness, numbness and headaches.   Psychiatric/Behavioral: Negative for confusion.       Physical Exam     Initial Vitals [04/15/22 1846]   BP Pulse Resp Temp SpO2   (!) 114/51 81 16 98.5 °F (36.9 °C) 100 %      MAP       --         Physical Exam    Nursing note and vitals reviewed.  Constitutional: She appears well-developed and well-nourished. She is not diaphoretic. No distress.   HENT:   Head: Normocephalic and atraumatic.   Mouth/Throat: Oropharynx is clear and moist. No oropharyngeal exudate.   Eyes: EOM are normal. Pupils are equal, round, and reactive to light.   Neck: Neck supple.   Normal range of motion.  Cardiovascular: Normal rate, regular rhythm and intact distal pulses.   No murmur heard.  Pulmonary/Chest: Breath sounds normal. No stridor. No respiratory distress.   Abdominal: Abdomen is soft. Bowel sounds are normal. There is no abdominal tenderness.   Musculoskeletal:         General: No tenderness or edema. Normal range of motion.      Cervical back: Normal range of motion and neck supple.     Neurological: She is alert and oriented to person, place, and time. She has normal strength. No cranial nerve deficit.   Skin: Skin is warm and dry. No erythema. No pallor.   Psychiatric: She has a normal mood and affect.         ED Course   Procedures  Labs Reviewed   POCT URINALYSIS W/O SCOPE - Abnormal; Notable for the following components:       Result Value    Blood, UA Trace-intact (*)     Leukocytes, UA Trace (*)     All other components  within normal limits   CULTURE, URINE    Narrative:     Indicated criteria for high risk culture:->Other  Other (specify):->-   POCT CBC   POCT URINE PREGNANCY   POCT URINALYSIS W/O SCOPE   POCT CMP   POCT CMP          Imaging Results          US Pelvis Complete Non OB (Final result)  Result time 04/15/22 21:21:09    Final result by Pinky Cruz MD (04/15/22 21:21:09)                 Impression:      No acute findings on pelvic ultrasound.      Electronically signed by: Pinky Cruz  Date:    04/15/2022  Time:    21:21             Narrative:    EXAMINATION:  ULTRASOUND OF THE PELVIS WITH TRANSVAGINAL    CLINICAL HISTORY:  Complaining of abdominal pain and diarrhea.    TECHNIQUE:  Real-time ultrasound of the pelvis was performed transabdominally and transvaginally.    COMPARISON:  None.    FINDINGS:  The uterus measures 7.8 x 5.1 x 5.4 cm.  There is a coarsened echotexture.  There are no uterine masses. The endometrial stripe measures 13 mm.    The right ovary measures 3.7 x 3.0 x 2.5 cm.  Vascular flow is present.    The left ovary measures 2.6 x 2.3 x 3.4 cm.  Vascular flow is present.    There is no free fluid in the pelvis.                                 Medications   sodium chloride 0.9% bolus 1,000 mL (0 mLs Intravenous Stopped 4/15/22 2139)     Medical Decision Making:   History:   Old Medical Records: I decided to obtain old medical records.  Initial Assessment:   46-year-old female presents to the emergency department complaining of feeling anemic which she reports is a feeling of lightheadedness and feeling out of sorts.  She also reports lower abdominal pain.  She denies vaginal bleeding discharge urinary frequency urgency hematuria or dysuria.  On physical exam her abdomen is soft and nontender x4 quadrants vital signs are reassuring.  On further conversation I discovered that the patient has had long-standing chronic anemia and she is preparing to have a colonoscopy to examine this  anemia.  Differential Diagnosis:   Blood loss anemia, iron deficiency anemia, colon neoplasm, urinary tract infection, STD, pregnancy  Clinical Tests:   Lab Tests: Ordered and Reviewed  ED Management:  All laboratories and diagnostics were essentially unchanged from previous.  Patient reported a history of ovarian fibroids however ultrasound did not demonstrate this.  I believe the patient has significant anxiety concerning her diagnosis of anemia and upcoming colonoscopy which may be contributing to her current symptomatology. See AVS for additional recommendations. Medications listed herein were prescribed after reviewing the patient's allergies, medication list, history, most recent laboratories as available.  Referrals below were provided after reviewing the patient's previous medical providers. She understands she  should return for any worsening or changes in condition.  Prior to discharge the patient was asked if she  had any additional concerns or complaints and she declined. The patient was given an opportunity to ask questions and all were answered to her satisfaction.           Scribe Attestation:   Scribe #1: I performed the above scribed service and the documentation accurately describes the services I performed. I attest to the accuracy of the note.         I, Michael Hernandez, SANDRA ACNP-BC FNP-C ENP-C , personally performed the services described in this documentation. All medical record entries made by the scribe were at my direction and in my presence. I have reviewed the chart and agree that the record reflects my personal performance and is accurate and complete.  ED Course as of 04/16/22 1149   Fri Apr 15, 2022   1942 BP: 113/67 [VC]   1942 Temp: 98.5 °F (36.9 °C) [VC]   1942 Temp src: Oral [VC]   1942 Pulse: 72 [VC]   1942 Resp: 16 [VC]   1942 SpO2: 100 % [VC]   2004 Preg Test, Ur: Negative [VC]   2004 POCT URINALYSIS W/O SCOPE(!)  Unlikely to represent uti. [VC]   2013 POCT CMP  Normal cmp.  [VC]   2013 POCT CBC  Anemia present as with previous cbc.  Normal wbc otherwise. [VC]   2112 Pulse: 69 [VC]   2112 SpO2: 100 % [VC]   2129 US Pelvis Complete Non OB       No acute findings on pelvic ultrasound. [VC]      ED Course User Index  [VC] Michael Hernandez DNP             Clinical Impression:   Final diagnoses:  [R10.30] Lower abdominal pain (Primary)  [R19.5] Loose stools          ED Disposition Condition    Discharge Stable        ED Prescriptions     Medication Sig Dispense Start Date End Date Auth. Provider    naproxen (NAPROSYN) 500 MG tablet Take 1 tablet (500 mg total) by mouth 2 (two) times daily with meals. for 7 days 14 tablet 4/15/2022 4/22/2022 Michael Hernandez DNP        Follow-up Information     Follow up With Specialties Details Why Contact Info    Juan Saldivar MD Family Medicine Schedule an appointment as soon as possible for a visit  As needed [address] [facility]  [place] [address]  [phone]             Michael Hernandez DNP  04/16/22 1150

## 2022-04-17 LAB — BACTERIA UR CULT: NO GROWTH

## 2022-06-28 ENCOUNTER — HOSPITAL ENCOUNTER (EMERGENCY)
Facility: HOSPITAL | Age: 47
Discharge: HOME | End: 2022-06-29
Attending: EMERGENCY MEDICINE
Payer: MEDICAID

## 2022-06-28 DIAGNOSIS — M79.606 LEG PAIN: ICD-10-CM

## 2022-06-28 DIAGNOSIS — R45.851 SUICIDAL IDEATIONS: ICD-10-CM

## 2022-06-28 DIAGNOSIS — R11.0 NAUSEA: ICD-10-CM

## 2022-06-28 DIAGNOSIS — F33.9 RECURRENT MAJOR DEPRESSIVE DISORDER, REMISSION STATUS UNSPECIFIED: Primary | ICD-10-CM

## 2022-06-28 LAB
ALBUMIN SERPL BCP-MCNC: 4 G/DL (ref 3.5–5.2)
ALP SERPL-CCNC: 78 U/L (ref 55–135)
ALT SERPL W/O P-5'-P-CCNC: 25 U/L (ref 10–44)
AMPHET+METHAMPHET UR QL: NEGATIVE
ANION GAP SERPL CALC-SCNC: 10 MMOL/L (ref 8–16)
AST SERPL-CCNC: 15 U/L (ref 10–40)
B-HCG UR QL: NEGATIVE
BACTERIA #/AREA URNS HPF: ABNORMAL /HPF
BARBITURATES UR QL SCN>200 NG/ML: NEGATIVE
BASOPHILS # BLD AUTO: 0.06 K/UL (ref 0–0.2)
BASOPHILS NFR BLD: 0.7 % (ref 0–1.9)
BENZODIAZ UR QL SCN>200 NG/ML: NEGATIVE
BILIRUB SERPL-MCNC: 0.4 MG/DL (ref 0.1–1)
BILIRUB UR QL STRIP: NEGATIVE
BUN SERPL-MCNC: 6 MG/DL (ref 6–20)
BZE UR QL SCN: NEGATIVE
CALCIUM SERPL-MCNC: 9.5 MG/DL (ref 8.7–10.5)
CANNABINOIDS UR QL SCN: NEGATIVE
CHLORIDE SERPL-SCNC: 102 MMOL/L (ref 95–110)
CLARITY UR: CLEAR
CO2 SERPL-SCNC: 27 MMOL/L (ref 23–29)
COLOR UR: YELLOW
CREAT SERPL-MCNC: 0.7 MG/DL (ref 0.5–1.4)
CREAT UR-MCNC: 81.7 MG/DL (ref 15–325)
CTP QC/QA: YES
DIFFERENTIAL METHOD: ABNORMAL
EOSINOPHIL # BLD AUTO: 0.1 K/UL (ref 0–0.5)
EOSINOPHIL NFR BLD: 1.3 % (ref 0–8)
ERYTHROCYTE [DISTWIDTH] IN BLOOD BY AUTOMATED COUNT: 19.7 % (ref 11.5–14.5)
EST. GFR  (AFRICAN AMERICAN): >60 ML/MIN/1.73 M^2
EST. GFR  (NON AFRICAN AMERICAN): >60 ML/MIN/1.73 M^2
GLUCOSE SERPL-MCNC: 105 MG/DL (ref 70–110)
GLUCOSE UR QL STRIP: NEGATIVE
HCT VFR BLD AUTO: 41.7 % (ref 37–48.5)
HGB BLD-MCNC: 12.9 G/DL (ref 12–16)
HGB UR QL STRIP: ABNORMAL
IMM GRANULOCYTES # BLD AUTO: 0.02 K/UL (ref 0–0.04)
IMM GRANULOCYTES NFR BLD AUTO: 0.2 % (ref 0–0.5)
KETONES UR QL STRIP: ABNORMAL
LEUKOCYTE ESTERASE UR QL STRIP: ABNORMAL
LYMPHOCYTES # BLD AUTO: 2.1 K/UL (ref 1–4.8)
LYMPHOCYTES NFR BLD: 23.1 % (ref 18–48)
MAGNESIUM SERPL-MCNC: 1.9 MG/DL (ref 1.6–2.6)
MCH RBC QN AUTO: 25.6 PG (ref 27–31)
MCHC RBC AUTO-ENTMCNC: 30.9 G/DL (ref 32–36)
MCV RBC AUTO: 83 FL (ref 82–98)
METHADONE UR QL SCN>300 NG/ML: NEGATIVE
MICROSCOPIC COMMENT: ABNORMAL
MONOCYTES # BLD AUTO: 0.7 K/UL (ref 0.3–1)
MONOCYTES NFR BLD: 7.4 % (ref 4–15)
NEUTROPHILS # BLD AUTO: 6.2 K/UL (ref 1.8–7.7)
NEUTROPHILS NFR BLD: 67.3 % (ref 38–73)
NITRITE UR QL STRIP: NEGATIVE
NRBC BLD-RTO: 0 /100 WBC
OPIATES UR QL SCN: NEGATIVE
PCP UR QL SCN>25 NG/ML: NEGATIVE
PH UR STRIP: 7 [PH] (ref 5–8)
PHOSPHATE SERPL-MCNC: 3.4 MG/DL (ref 2.7–4.5)
PLATELET # BLD AUTO: 249 K/UL (ref 150–450)
PMV BLD AUTO: 10.5 FL (ref 9.2–12.9)
POTASSIUM SERPL-SCNC: 3.7 MMOL/L (ref 3.5–5.1)
PROT SERPL-MCNC: 7.6 G/DL (ref 6–8.4)
PROT UR QL STRIP: NEGATIVE
RBC # BLD AUTO: 5.04 M/UL (ref 4–5.4)
RBC #/AREA URNS HPF: >100 /HPF (ref 0–4)
SODIUM SERPL-SCNC: 139 MMOL/L (ref 136–145)
SP GR UR STRIP: 1.01 (ref 1–1.03)
SQUAMOUS #/AREA URNS HPF: 1 /HPF
TOXICOLOGY INFORMATION: NORMAL
TROPONIN I SERPL DL<=0.01 NG/ML-MCNC: <0.006 NG/ML (ref 0–0.03)
TSH SERPL DL<=0.005 MIU/L-ACNC: 0.96 UIU/ML (ref 0.4–4)
URN SPEC COLLECT METH UR: ABNORMAL
UROBILINOGEN UR STRIP-ACNC: NEGATIVE EU/DL
WBC # BLD AUTO: 9.23 K/UL (ref 3.9–12.7)
WBC #/AREA URNS HPF: 3 /HPF (ref 0–5)

## 2022-06-28 PROCEDURE — 99285 EMERGENCY DEPT VISIT HI MDM: CPT | Mod: 25

## 2022-06-28 PROCEDURE — 82077 ASSAY SPEC XCP UR&BREATH IA: CPT | Performed by: EMERGENCY MEDICINE

## 2022-06-28 PROCEDURE — 83735 ASSAY OF MAGNESIUM: CPT | Performed by: EMERGENCY MEDICINE

## 2022-06-28 PROCEDURE — 85025 COMPLETE CBC W/AUTO DIFF WBC: CPT | Performed by: EMERGENCY MEDICINE

## 2022-06-28 PROCEDURE — 99205 PR OFFICE/OUTPT VISIT, NEW, LEVL V, 60-74 MIN: ICD-10-PCS | Mod: 95,AF,HB, | Performed by: PSYCHIATRY & NEUROLOGY

## 2022-06-28 PROCEDURE — 96361 HYDRATE IV INFUSION ADD-ON: CPT

## 2022-06-28 PROCEDURE — 80053 COMPREHEN METABOLIC PANEL: CPT | Performed by: EMERGENCY MEDICINE

## 2022-06-28 PROCEDURE — 99205 OFFICE O/P NEW HI 60 MIN: CPT | Mod: 95,AF,HB, | Performed by: PSYCHIATRY & NEUROLOGY

## 2022-06-28 PROCEDURE — 81000 URINALYSIS NONAUTO W/SCOPE: CPT | Mod: 59 | Performed by: EMERGENCY MEDICINE

## 2022-06-28 PROCEDURE — 84100 ASSAY OF PHOSPHORUS: CPT | Performed by: EMERGENCY MEDICINE

## 2022-06-28 PROCEDURE — 84443 ASSAY THYROID STIM HORMONE: CPT | Performed by: EMERGENCY MEDICINE

## 2022-06-28 PROCEDURE — 81025 URINE PREGNANCY TEST: CPT | Performed by: EMERGENCY MEDICINE

## 2022-06-28 PROCEDURE — 25000003 PHARM REV CODE 250: Performed by: EMERGENCY MEDICINE

## 2022-06-28 PROCEDURE — 93005 ELECTROCARDIOGRAM TRACING: CPT

## 2022-06-28 PROCEDURE — 93010 ELECTROCARDIOGRAM REPORT: CPT | Mod: ,,, | Performed by: INTERNAL MEDICINE

## 2022-06-28 PROCEDURE — 80307 DRUG TEST PRSMV CHEM ANLYZR: CPT | Performed by: EMERGENCY MEDICINE

## 2022-06-28 PROCEDURE — 93010 EKG 12-LEAD: ICD-10-PCS | Mod: ,,, | Performed by: INTERNAL MEDICINE

## 2022-06-28 PROCEDURE — U0002 COVID-19 LAB TEST NON-CDC: HCPCS | Performed by: EMERGENCY MEDICINE

## 2022-06-28 PROCEDURE — 80143 DRUG ASSAY ACETAMINOPHEN: CPT | Performed by: EMERGENCY MEDICINE

## 2022-06-28 PROCEDURE — 96360 HYDRATION IV INFUSION INIT: CPT

## 2022-06-28 PROCEDURE — 84484 ASSAY OF TROPONIN QUANT: CPT | Performed by: EMERGENCY MEDICINE

## 2022-06-28 PROCEDURE — 80179 DRUG ASSAY SALICYLATE: CPT | Performed by: EMERGENCY MEDICINE

## 2022-06-28 RX ORDER — ACETAMINOPHEN 500 MG
1000 TABLET ORAL
Status: COMPLETED | OUTPATIENT
Start: 2022-06-28 | End: 2022-06-28

## 2022-06-28 RX ORDER — METHOCARBAMOL 500 MG/1
1000 TABLET, FILM COATED ORAL
Status: COMPLETED | OUTPATIENT
Start: 2022-06-28 | End: 2022-06-28

## 2022-06-28 RX ORDER — IBUPROFEN 600 MG/1
600 TABLET ORAL
Status: DISCONTINUED | OUTPATIENT
Start: 2022-06-28 | End: 2022-06-29 | Stop reason: HOSPADM

## 2022-06-28 RX ADMIN — SODIUM CHLORIDE 1000 ML: 0.9 INJECTION, SOLUTION INTRAVENOUS at 07:06

## 2022-06-28 RX ADMIN — METHOCARBAMOL 1000 MG: 500 TABLET ORAL at 11:06

## 2022-06-28 RX ADMIN — ACETAMINOPHEN 1000 MG: 500 TABLET ORAL at 08:06

## 2022-06-28 NOTE — ED TRIAGE NOTES
"Pt self presents with anxiety and depression. Pt states has been having some leg pain and "I might have a UTI'. When asked if she had any SI or HI pt did not reply and began crying. Pt was not willing to continue intake any further at this point.  "

## 2022-06-29 VITALS
HEIGHT: 66 IN | DIASTOLIC BLOOD PRESSURE: 80 MMHG | SYSTOLIC BLOOD PRESSURE: 136 MMHG | TEMPERATURE: 98 F | HEART RATE: 80 BPM | WEIGHT: 145 LBS | BODY MASS INDEX: 23.3 KG/M2 | OXYGEN SATURATION: 99 % | RESPIRATION RATE: 16 BRPM

## 2022-06-29 LAB
APAP SERPL-MCNC: 7 UG/ML (ref 10–20)
CTP QC/QA: YES
ETHANOL SERPL-MCNC: <10 MG/DL
SALICYLATES SERPL-MCNC: <5 MG/DL (ref 15–30)
SARS-COV-2 RDRP RESP QL NAA+PROBE: NEGATIVE

## 2022-06-29 NOTE — CONSULTS
"[facility]  Psychiatry  Telepsychiatry Consult Note    Please see previous notes:    Patient agreeable to consultation via telepsychiatry.    Tele-Consultation from Psychiatry started: 6/28/2022 at 11:18pm  The chief complaint leading to psychiatric consultation is: SI  This consultation was requested by Dr Boggs, the Emergency Department attending physician.  The location of the consulting psychiatrist is [place].  The patient location is  [facility] EMERGENCY DEPARTMENT   The patient arrived at the ED at:     Also present with the patient at the time of the consultation: nobody    Patient Identification:   Milagro Gomez is a 46 y.o. female.    Patient information was obtained from patient and past medical records.  Patient presented voluntarily to the Emergency Department by private vehicle.    Consults  Teleconsult Time Documentation  Subjective:     History of Present Illness:  45yo f with hx of depression  presented complaining of UTI. When asked about SI she started to cry and psychiatric consulted.    On assessment, patient reported since COVID, she has been having H Pylori, problems with her legs she did not specify. She reports she has been worried about her health and feeling sick. "I feel like my body has fallen apart." Patient reports she takes zoloft for depression but helps her feel more "level minded"  But she is still worried. Takes zoloft 100mg per day. Started on it two weeks ago. Patient reports she got out of the psychiatric facility on Friday which helped partially. However, she reports mood worsened after discharge and she started to feel hopeless. Patient reports suicidal thoughts also returned. Reports ongoing depressed mood, anhedonia, feeling down on herself, amotivation. Worsened sx since February. Also reports significantly increased anxiety. No panic attacks  No psychotic sx  No reexperiencing sx    This is the extent of patients complaints at this time.  12 pt ros was " "negative aside from sx noted above    Psychiatric History:   Previous Psychiatric Hospitalizations: Yes   Previous Medication Trials: Yes , prozac- did not work, buspar- did not like, gabapentin- made me tired, seroquel- made her sick, abilify- "not for me", lamictal- never tried, vraylar- never tried, lithium-never tried.  Previous Suicide Attempts: no  History of Violence: no  History of Depression: yes  History of Veronica: no  History of Auditory/Visual Hallucination no  History of Delusions: no  Outpatient psychiatrist (current & past): none outpatien yet    Substance Abuse History:  Tobacco:No  Alcohol: No  Illicit Substances:No  Detox/Rehab: No    Legal History: Past charges/incarcerations: No     Family Psychiatric History: father-bipolar  Brother-bipolar      Social History: Lost home in [place]. Living in a hotel with fiance. 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 Denied access to firearms.   No  hx  6 children, not living with her      Psychiatric Mental Status Exam:  Arousal: alert  Sensorium/Orientation: oriented to grossly intact  Behavior/Cooperation: normal, cooperative   Speech: normal tone, normal rate, normal pitch, normal volume  Language: not tested  Mood: " depressed "   Affect: constricted  Thought Process: normal and logical  Thought Content:   Auditory hallucinations: NO  Visual hallucinations: NO  Paranoia: NO  Delusions:  NO  Suicidal ideation: YES:      Homicidal ideation: NO  Attention/Concentration:  intact  Memory:    Recent:  Intact   Remote: Intact    Fund of Knowledge: Aware of current events   Abstract reasoning: similarities were abstract  Insight: intact  Judgment: behavior is adequate to circumstances      Past Medical History:   Past Medical History:   Diagnosis Date    Anxiety disorder, unspecified     Depression     Fibroids       Laboratory Data:   Labs Reviewed   CBC W/ AUTO DIFFERENTIAL - Abnormal; Notable for the following components:       Result Value "    MCH 25.6 (*)     MCHC 30.9 (*)     RDW 19.7 (*)     All other components within normal limits   URINALYSIS - Abnormal; Notable for the following components:    Ketones, UA 1+ (*)     Occult Blood UA 3+ (*)     Leukocytes, UA Trace (*)     All other components within normal limits    Narrative:     Specimen Source->Urine   URINALYSIS MICROSCOPIC - Abnormal; Notable for the following components:    RBC, UA >100 (*)     All other components within normal limits    Narrative:     Specimen Source->Urine   COMPREHENSIVE METABOLIC PANEL   DRUG SCREEN PANEL, URINE EMERGENCY    Narrative:     Specimen Source->Urine   MAGNESIUM   TROPONIN I   TSH   PHOSPHORUS   ALCOHOL,MEDICAL (ETHANOL)   ACETAMINOPHEN LEVEL   SALICYLATE LEVEL   POCT URINE PREGNANCY       Allergies:   Review of patient's allergies indicates:   Allergen Reactions    Codeine Shortness Of Breath and Hives    Iodine Shortness Of Breath    Penicillins Shortness Of Breath and Anaphylaxis    Shellfish containing products Shortness Of Breath and Swelling    Latex Rash    Latex, natural rubber Rash       Medications in ER:   Medications   ibuprofen tablet 600 mg (600 mg Oral Not Given 6/28/22 2030)   methocarbamoL tablet 1,000 mg (has no administration in time range)   sodium chloride 0.9% bolus 1,000 mL (0 mLs Intravenous Stopped 6/28/22 2219)   acetaminophen tablet 1,000 mg (1,000 mg Oral Given 6/28/22 2023)       Medications at home: reviewed with patient and in MAR    No new subjective & objective note has been filed under this hospital service since the last note was generated.      Assessment - Diagnosis - Goals:     Diagnosis/Impression: MDD- severe recurrent without psychotic features    Rec:   1. Recommend PEC given risk of harm to self and grave disability. Inpatient psychiatric tx once medically cleared  2.  Ativan 1mg IV/IM q 4 hours prn severe anxiety or agitation  3. Will defer to inpatient psychiatry to modify scheduled medication  regimen    Time with patient, coordinating care: 30 min      More than 50% of the time was spent counseling/coordinating care    Consulting clinician was informed of the encounter and consult note.    Consultation ended: 6/28/2022 at 11:50pm    Rupesh Hightower MD  Psychiatry  [facility]

## 2022-06-29 NOTE — ED PROVIDER NOTES
Encounter Date: 6/28/2022       History     Chief Complaint   Patient presents with    Nausea    Anxiety     Pt c/o anxiety accompanied by nausea and soft stool since yesterday. Pt states she take zoloft for her anxiety but it is not helping. Pt is hesitant when asked if she has SI. Pt denies HI.      46-year-old female who presents with multiple complaints.  She has complaints of bilateral leg pain that she describes as cramping, moderate, constant, with nothing exacerbating or relieving the pain.  She is currently on her cycle and complains of some suprapubic abdominal pain that is mild to moderate, aching, constant, with palpation exacerbating the pain.  She does complain of some nausea and vomiting.  She does complain of some loose stools.  She thinks she may have a UTI.  She denies any fever/chills.  She is tearful on arrival states that she is not feel like her Zoloft is helping her.  I asked multiple times about to suicidal ideation and the patient is not very forthcoming with me.        Review of patient's allergies indicates:   Allergen Reactions    Codeine Shortness Of Breath and Hives    Iodine Shortness Of Breath    Penicillins Shortness Of Breath and Anaphylaxis    Shellfish containing products Shortness Of Breath and Swelling    Latex Rash    Latex, natural rubber Rash     Past Medical History:   Diagnosis Date    Anxiety disorder, unspecified     Depression     Fibroids      Past Surgical History:   Procedure Laterality Date    TONSILLECTOMY       History reviewed. No pertinent family history.  Social History     Tobacco Use    Smoking status: Former Smoker     Types: Cigarettes     Quit date: 2/10/2019     Years since quitting: 3.3    Smokeless tobacco: Never Used   Substance Use Topics    Alcohol use: Not Currently    Drug use: No     Review of Systems   Musculoskeletal: Positive for myalgias.   All other systems reviewed and are negative.      Physical Exam     Initial Vitals  [06/28/22 1847]   BP Pulse Resp Temp SpO2   (!) 175/83 79 15 98.2 °F (36.8 °C) 99 %      MAP       --         Physical Exam    Nursing note and vitals reviewed.  Constitutional:   Patient is tearful   HENT:   Head: Normocephalic and atraumatic.   Eyes: EOM are normal. Pupils are equal, round, and reactive to light.   Cardiovascular: Normal rate and regular rhythm.   No murmur heard.  Pulmonary/Chest: Breath sounds normal. She has no wheezes.   Abdominal: Abdomen is soft. There is no abdominal tenderness.   Musculoskeletal:         General: Normal range of motion.      Comments: There is no midline tenderness to palpation or step-off deformities noted to the lumbar spine     Neurological: She is alert and oriented to person, place, and time. GCS score is 15. GCS eye subscore is 4. GCS verbal subscore is 5. GCS motor subscore is 6.   Skin: Skin is warm and dry. Capillary refill takes less than 2 seconds.   Psychiatric:   Tearful         ED Course   Procedures  Labs Reviewed   CBC W/ AUTO DIFFERENTIAL - Abnormal; Notable for the following components:       Result Value    MCH 25.6 (*)     MCHC 30.9 (*)     RDW 19.7 (*)     All other components within normal limits   URINALYSIS - Abnormal; Notable for the following components:    Ketones, UA 1+ (*)     Occult Blood UA 3+ (*)     Leukocytes, UA Trace (*)     All other components within normal limits    Narrative:     Specimen Source->Urine   URINALYSIS MICROSCOPIC - Abnormal; Notable for the following components:    RBC, UA >100 (*)     All other components within normal limits    Narrative:     Specimen Source->Urine   ACETAMINOPHEN LEVEL - Abnormal; Notable for the following components:    Acetaminophen (Tylenol), Serum 7.0 (*)     All other components within normal limits   SALICYLATE LEVEL - Abnormal; Notable for the following components:    Salicylate Lvl <5.0 (*)     All other components within normal limits   COMPREHENSIVE METABOLIC PANEL   DRUG SCREEN PANEL,  URINE EMERGENCY    Narrative:     Specimen Source->Urine   MAGNESIUM   TROPONIN I   TSH   PHOSPHORUS   ALCOHOL,MEDICAL (ETHANOL)   POCT URINE PREGNANCY   SARS-COV-2 RDRP GENE     EKG Readings: (Independently Interpreted)   Sinus rhythm rate 89, normal axis, normal intervals, nonspecific T-wave abnormalities interpreted by me     ECG Results          EKG 12-LEAD (Final result)  Result time 06/29/22 14:39:49    Final result by Unknown User (06/29/22 14:39:49)                                Imaging Results          US Lower Extremity Veins Bilateral (Final result)  Result time 06/28/22 23:32:04    Final result by Jareth Ott MD (06/28/22 23:32:04)                 Impression:      No evidence of lower extremity deep venous thrombosis.      Electronically signed by: Jareth Ott MD  Date:    06/28/2022  Time:    23:32             Narrative:    EXAMINATION:  US LOWER EXTREMITY VEINS BILATERAL    CLINICAL HISTORY:  Pain in leg, unspecified    TECHNIQUE:  Duplex and color flow Doppler evaluation of the bilateral lower extremity veins was performed.    COMPARISON:  04/10/2022.    FINDINGS:  No evidence of clot involving the bilateral common femoral, greater saphenous, femoral, popliteal, peroneal, anterior and posterior tibial veins.  All venous structures demonstrate normal respiratory phasicity and augment adequately.  No evidence of soft tissue mass or Baker's cyst.                                 Medications   sodium chloride 0.9% bolus 1,000 mL (0 mLs Intravenous Stopped 6/28/22 2219)   acetaminophen tablet 1,000 mg (1,000 mg Oral Given 6/28/22 2023)   methocarbamoL tablet 1,000 mg (1,000 mg Oral Given 6/28/22 2330)     Medical Decision Making:   ED Management:  Patient medically cleared, was evaluated by tele psychiatry recommends inpatient admission secondary to depression and recurrence of feelings of suicide.                 Medically cleared for psychiatry placement: 6/29/2022 12:16 AM    Clinical  Impression:   Final diagnoses:  [R11.0] Nausea  [M79.606] Leg pain  [F33.9] Recurrent major depressive disorder, remission status unspecified (Primary)  [R45.851] Suicidal ideations          ED Disposition Condition    Transfer to Psych Facility         ED Prescriptions     None        Follow-up Information    None          Jimbo Boggs DO  06/29/22 3374

## 2022-06-29 NOTE — ED NOTES
Patient wanded by security. Placed in paper scrubs. Cooperative at this time. Patient continues to be tearful. Sitter at bedside for 1:1 observation.

## 2022-06-29 NOTE — ED NOTES
"Patient presents to ED secondary to nausea and anxiety. Pt c/o "not feeling right since having Covid in February." C/o pain to bilateral legs, abdominal pain and back pain. Pt denies cp, sob, URI symptoms, fever, chills, v/d, urinary symptoms, and rash of any kind. Pt endorses recent psychiatric hospitalization and new medication regimen. States was feeling better and discharged on Friday. States once returned home, hopelessness feeling returned. Denies SI, HI and hallucinations to this nurse but endorsed SI to psychiatrist during tele psych consultation. Pt is cooperative but has bouts of crying spells. Sitter at bedside for 1:1 observation. Will continue to monitor.     "

## 2022-06-29 NOTE — ED NOTES
When trying to do pts intake, she became increasingly tearful. Pt declined to answer question of SI/HI. Pt crying and requesting to use bathroom. Urine cup given and explained need to provide sample.

## 2023-01-17 ENCOUNTER — HOSPITAL ENCOUNTER (EMERGENCY)
Facility: HOSPITAL | Age: 48
Discharge: LEFT BEFORE DISCHARGE | End: 2023-01-17
Payer: MEDICAID

## 2023-01-17 VITALS
RESPIRATION RATE: 18 BRPM | HEIGHT: 66 IN | HEART RATE: 89 BPM | TEMPERATURE: 98 F | DIASTOLIC BLOOD PRESSURE: 63 MMHG | SYSTOLIC BLOOD PRESSURE: 110 MMHG | OXYGEN SATURATION: 98 % | BODY MASS INDEX: 24.91 KG/M2 | WEIGHT: 155 LBS

## 2023-01-17 LAB
B-HCG UR QL: NEGATIVE
CTP QC/QA: YES

## 2023-01-17 PROCEDURE — 81025 URINE PREGNANCY TEST: CPT | Mod: ER | Performed by: EMERGENCY MEDICINE

## 2023-01-17 PROCEDURE — 99900041 HC LEFT WITHOUT BEING SEEN- EMERGENCY: Mod: ER

## 2023-01-27 ENCOUNTER — HOSPITAL ENCOUNTER (EMERGENCY)
Facility: HOSPITAL | Age: 48
Discharge: HOME | End: 2023-01-27
Attending: EMERGENCY MEDICINE
Payer: MEDICAID

## 2023-01-27 VITALS
TEMPERATURE: 99 F | RESPIRATION RATE: 19 BRPM | OXYGEN SATURATION: 100 % | WEIGHT: 156 LBS | SYSTOLIC BLOOD PRESSURE: 127 MMHG | HEIGHT: 66 IN | BODY MASS INDEX: 25.07 KG/M2 | HEART RATE: 93 BPM | DIASTOLIC BLOOD PRESSURE: 67 MMHG

## 2023-01-27 DIAGNOSIS — M79.604 CHRONIC PAIN OF BOTH LOWER EXTREMITIES: Primary | ICD-10-CM

## 2023-01-27 DIAGNOSIS — J30.9 ALLERGIC RHINITIS, UNSPECIFIED SEASONALITY, UNSPECIFIED TRIGGER: ICD-10-CM

## 2023-01-27 DIAGNOSIS — M79.605 CHRONIC PAIN OF BOTH LOWER EXTREMITIES: Primary | ICD-10-CM

## 2023-01-27 DIAGNOSIS — G89.29 CHRONIC PAIN OF BOTH LOWER EXTREMITIES: Primary | ICD-10-CM

## 2023-01-27 LAB
B-HCG UR QL: NEGATIVE
BILIRUBIN, POC UA: NEGATIVE
BLOOD, POC UA: NEGATIVE
CLARITY, POC UA: CLEAR
COLOR, POC UA: ABNORMAL
CTP QC/QA: YES
GLUCOSE, POC UA: NEGATIVE
KETONES, POC UA: NEGATIVE
LEUKOCYTE EST, POC UA: ABNORMAL
NITRITE, POC UA: NEGATIVE
PH UR STRIP: 5.5 [PH]
PROTEIN, POC UA: NEGATIVE
SPECIFIC GRAVITY, POC UA: 1.01
UROBILINOGEN, POC UA: 0.2 E.U./DL

## 2023-01-27 PROCEDURE — 81025 URINE PREGNANCY TEST: CPT | Mod: ER | Performed by: EMERGENCY MEDICINE

## 2023-01-27 PROCEDURE — 81003 URINALYSIS AUTO W/O SCOPE: CPT | Mod: ER

## 2023-01-27 PROCEDURE — 99282 EMERGENCY DEPT VISIT SF MDM: CPT | Mod: ER

## 2023-01-27 NOTE — DISCHARGE INSTRUCTIONS
Claritin or zyrtec and flonase to help with congestion  Tylenol or ibuprofen as needed for leg pain

## 2023-01-27 NOTE — ED NOTES
PT STATES THAT SHE HAD A 2 STORY FALL 2 YEARS AND IS C/O BILATERAL THIGH PAIN X 1 YEAR. NO BRUISING,REDNESS OR HEAT OBSERVED. BILATERAL CIRCUMFERENCE IS 20 INCHES ON MID THIGH AREA.

## 2023-01-27 NOTE — ED PROVIDER NOTES
Encounter Date: 1/27/2023    SCRIBE #1 NOTE: Artemio SOLIZ am scribing for, and in the presence of,  KAMAR Conley.     History     Chief Complaint   Patient presents with    URI     Cough and sinus for 2-3 weeks    Leg Pain     Pain to bilat thighs for a year after a fall 2 story high.     48 y/o female with no pertinent medical history presents to ED with sinus pain, cough, congestion, and resolved fever for 1 month. She also expresses concerns about chronic bilateral thigh pain, which she has had since a fall 2 years ago. No other associated symptoms. No attempted treatment. She denies other associated symptoms. No known sick contacts.     The history is provided by the patient. No  was used.   Review of patient's allergies indicates:   Allergen Reactions    Codeine Shortness Of Breath and Hives    Iodine Shortness Of Breath    Penicillins Shortness Of Breath and Anaphylaxis    Shellfish containing products Shortness Of Breath and Swelling    Latex Rash    Latex, natural rubber Rash     Past Medical History:   Diagnosis Date    Anxiety disorder, unspecified     Depression     Fibroids      Past Surgical History:   Procedure Laterality Date    TONSILLECTOMY       No family history on file.  Social History     Tobacco Use    Smoking status: Former     Types: Cigarettes     Quit date: 2/10/2019     Years since quitting: 3.9    Smokeless tobacco: Never   Substance Use Topics    Alcohol use: Not Currently    Drug use: No     Review of Systems   Constitutional:  Positive for fever (resolved). Negative for chills.   HENT:  Positive for congestion and sinus pain. Negative for sore throat.    Respiratory:  Positive for cough. Negative for chest tightness, shortness of breath and stridor.    Cardiovascular:  Negative for chest pain.   Gastrointestinal:  Negative for abdominal pain, nausea and vomiting.   Genitourinary:  Negative for dysuria and genital sores.   Musculoskeletal:  Positive for  myalgias (thigh). Negative for back pain.   Skin:  Negative for rash.   Neurological:  Negative for weakness.   Hematological:  Does not bruise/bleed easily.   All other systems reviewed and are negative.    Physical Exam     Initial Vitals [01/27/23 1452]   BP Pulse Resp Temp SpO2   120/78 99 20 98.4 °F (36.9 °C) 98 %      MAP       --         Physical Exam    Nursing note and vitals reviewed.  Constitutional: She appears well-developed and well-nourished.   HENT:   Head: Normocephalic and atraumatic.   Swollen turbinates noted to bilateral nares and cobblestone appearance to posterior oropharynx; bilateral clear middle ear effusions   Eyes: Conjunctivae and EOM are normal. Pupils are equal, round, and reactive to light.   Neck:   Normal range of motion.  Cardiovascular:  Normal rate, regular rhythm, normal heart sounds and intact distal pulses.     Exam reveals no gallop and no friction rub.       No murmur heard.  Pulmonary/Chest: Breath sounds normal. No respiratory distress. She has no wheezes. She has no rhonchi. She has no rales. She exhibits no tenderness.   Abdominal: Abdomen is soft. Bowel sounds are normal. She exhibits no distension and no mass. There is no abdominal tenderness. There is no rebound and no guarding.   Musculoskeletal:         General: No tenderness or edema. Normal range of motion.      Cervical back: Normal range of motion.      Comments: Thighs are equal in size and measure equally     Neurological: She is alert and oriented to person, place, and time. She has normal strength. GCS score is 15. GCS eye subscore is 4. GCS verbal subscore is 5. GCS motor subscore is 6.   Skin: Skin is warm. Capillary refill takes less than 2 seconds.   Psychiatric: She has a normal mood and affect.     ED Course   Procedures  Labs Reviewed   POCT URINALYSIS W/O SCOPE - Abnormal; Notable for the following components:       Result Value    Leukocytes, UA Trace (*)     All other components within normal  limits   POCT URINE PREGNANCY   POCT URINALYSIS W/O SCOPE           Medications - No data to display  Medical Decision Making:   History:   Old Medical Records: I decided to obtain old medical records.  Initial Assessment:   46 y/o female which presents to the ED with chronic conditions.  Differential Diagnosis:   Chronic leg pain, congestion, allergic rhinitis  Clinical Tests:   Lab Tests: Ordered and Reviewed  ED Management:  Pt examined and has swollen turbinates and PND along with bilateral clear middle ear effusion. She has been advised to take zyrtec/claritin/allegra/xyzal and flonase to help alleviate congestion. Pt advised that her leg pain is chronic and she has had a full work up that is negative for her leg pain. She was concerned about a blood clot and her legs measured the same circumference. No swelling or redness and the fact that the pain has occurred for 2 years and is bilateral, it is unlikely a blood clot. Patient given strict return precautions and voiced understanding of all discharge instructions. Pt was stable at discharge.               Scribe Attestation:   Scribe #1: I performed the above scribed service and the documentation accurately describes the services I performed. I attest to the accuracy of the note.      ED Course as of 01/27/23 1740   Fri Jan 27, 2023   1536 Preg Test, Ur: Negative [AT]   1536 BP: 120/78 [AT]   1536 Temp: 98.4 °F (36.9 °C) [AT]   1536 Temp src: Oral [AT]   1536 Pulse: 99 [AT]   1536 Resp: 20 [AT]   1536 SpO2: 98 % [AT]      ED Course User Index  [AT] KAMAR Kaiser          I, KAMAR Kaiser, personally performed the services described in this documentation.  All medical record entries made by the scribe were at my direction and in my presence.  I have reviewed the chart and agree that the record reflects my personal performance and is accurate and complete.         Clinical Impression:   Final diagnoses:  [M79.604, M79.605, G89.29] Chronic pain  of both lower extremities (Primary)  [J30.9] Allergic rhinitis, unspecified seasonality, unspecified trigger        ED Disposition Condition    Discharge Stable          ED Prescriptions    None       Follow-up Information       Follow up With Specialties Details Why Contact Info    Juan Saldivar MD Family Medicine Schedule an appointment as soon as possible for a visit  As needed [address] [place] [address]  [phone]               Tejal Craven, [facility]  01/27/23 8338

## 2023-01-27 NOTE — ED NOTES
NAD AT THIS TIME. AAOX4. PT VERBALIZED THAT SHE WILL NOTIFY HER PCP OF ER VISIT AND CONTINUE HER PHYSICAL THERAPY AS PREVIOUSLY STARTED 3 MONTHS AGO. RX AND D/C INFOMATION GIVEN TO REVIEWED WITH PT. PT DENIES ANY FURTHER NEEDS OR QUESTIONS. AMB OUT TO POV WITH STEADY GAIT.

## 2023-04-25 ENCOUNTER — TELEPHONE (OUTPATIENT)
Dept: ORTHOPEDICS | Facility: CLINIC | Age: 48
End: 2023-04-25
Payer: MEDICAID

## 2023-04-25 NOTE — TELEPHONE ENCOUNTER
spoke to patient she was informed she need a referral to be seen patient asked for fax number which was given to her patient stated she will see her PCP on Friday.

## 2023-04-25 NOTE — TELEPHONE ENCOUNTER
----- Message from Melissa Chatterjee sent at 4/25/2023  2:19 PM CDT -----  Pt Requesting to Schedule an Appointment     Pt is requesting to schedule an appointment that our scheduling dept cannot schedule.    Who called: pt  Best call back #: [phone]  When pt wants appt:  Reason for appt: lower body pain  Additional notes: system said to send an in-basket message because pt have these symptoms due to a fall

## 2023-05-02 ENCOUNTER — HOSPITAL ENCOUNTER (EMERGENCY)
Facility: HOSPITAL | Age: 48
Discharge: HOME | End: 2023-05-02
Attending: EMERGENCY MEDICINE
Payer: MEDICAID

## 2023-05-02 VITALS
BODY MASS INDEX: 25.71 KG/M2 | HEART RATE: 77 BPM | HEIGHT: 66 IN | OXYGEN SATURATION: 99 % | WEIGHT: 160 LBS | RESPIRATION RATE: 18 BRPM | TEMPERATURE: 99 F | SYSTOLIC BLOOD PRESSURE: 132 MMHG | DIASTOLIC BLOOD PRESSURE: 83 MMHG

## 2023-05-02 DIAGNOSIS — G89.29 CHRONIC BILATERAL LOW BACK PAIN WITHOUT SCIATICA: ICD-10-CM

## 2023-05-02 DIAGNOSIS — M79.605 LEG PAIN, BILATERAL: Primary | ICD-10-CM

## 2023-05-02 DIAGNOSIS — M79.604 LEG PAIN, BILATERAL: Primary | ICD-10-CM

## 2023-05-02 DIAGNOSIS — M54.50 CHRONIC BILATERAL LOW BACK PAIN WITHOUT SCIATICA: ICD-10-CM

## 2023-05-02 LAB
B-HCG UR QL: NEGATIVE
BILIRUBIN, POC UA: NEGATIVE
BLOOD, POC UA: ABNORMAL
CLARITY, POC UA: CLEAR
COLOR, POC UA: YELLOW
CTP QC/QA: YES
GLUCOSE, POC UA: NEGATIVE
KETONES, POC UA: NEGATIVE
LEUKOCYTE EST, POC UA: NEGATIVE
NITRITE, POC UA: NEGATIVE
PH UR STRIP: 6 [PH]
PROTEIN, POC UA: NEGATIVE
SPECIFIC GRAVITY, POC UA: 1.01
UROBILINOGEN, POC UA: 0.2 E.U./DL

## 2023-05-02 PROCEDURE — 63600175 PHARM REV CODE 636 W HCPCS: Mod: ER | Performed by: NURSE PRACTITIONER

## 2023-05-02 PROCEDURE — 81025 URINE PREGNANCY TEST: CPT | Mod: ER | Performed by: NURSE PRACTITIONER

## 2023-05-02 PROCEDURE — 99285 EMERGENCY DEPT VISIT HI MDM: CPT | Mod: 25,ER

## 2023-05-02 PROCEDURE — 81003 URINALYSIS AUTO W/O SCOPE: CPT | Mod: ER

## 2023-05-02 PROCEDURE — 96372 THER/PROPH/DIAG INJ SC/IM: CPT | Performed by: NURSE PRACTITIONER

## 2023-05-02 RX ORDER — NITROFURANTOIN 25; 75 MG/1; MG/1
100 CAPSULE ORAL EVERY 12 HOURS
COMMUNITY
Start: 2023-04-03 | End: 2023-05-02

## 2023-05-02 RX ORDER — FERROUS GLUCONATE 324(38)MG
1 TABLET ORAL 2 TIMES DAILY
COMMUNITY
Start: 2023-03-24

## 2023-05-02 RX ORDER — KETOROLAC TROMETHAMINE 30 MG/ML
30 INJECTION, SOLUTION INTRAMUSCULAR; INTRAVENOUS
Status: COMPLETED | OUTPATIENT
Start: 2023-05-02 | End: 2023-05-02

## 2023-05-02 RX ORDER — GABAPENTIN 300 MG/1
300 CAPSULE ORAL 3 TIMES DAILY
Qty: 45 CAPSULE | Refills: 0 | Status: ON HOLD | OUTPATIENT
Start: 2023-05-02 | End: 2023-06-25 | Stop reason: HOSPADM

## 2023-05-02 RX ORDER — TIZANIDINE 4 MG/1
TABLET ORAL
COMMUNITY
Start: 2023-03-17 | End: 2023-05-02

## 2023-05-02 RX ORDER — IBUPROFEN 800 MG/1
1 TABLET ORAL
COMMUNITY
Start: 2023-01-17 | End: 2023-07-21 | Stop reason: SDUPTHER

## 2023-05-02 RX ORDER — METHOCARBAMOL 500 MG/1
1000 TABLET, FILM COATED ORAL 3 TIMES DAILY
Qty: 30 TABLET | Refills: 0 | Status: SHIPPED | OUTPATIENT
Start: 2023-05-02 | End: 2023-05-07

## 2023-05-02 RX ORDER — GABAPENTIN 300 MG/1
300 CAPSULE ORAL 3 TIMES DAILY
COMMUNITY
Start: 2023-04-12 | End: 2023-05-02 | Stop reason: SDUPTHER

## 2023-05-02 RX ORDER — BACLOFEN 10 MG/1
5-10 TABLET ORAL 2 TIMES DAILY PRN
COMMUNITY
Start: 2023-04-14 | End: 2023-05-02

## 2023-05-02 RX ADMIN — KETOROLAC TROMETHAMINE 30 MG: 30 INJECTION, SOLUTION INTRAMUSCULAR; INTRAVENOUS at 08:05

## 2023-05-03 NOTE — ED PROVIDER NOTES
Encounter Date: 2023       History     Chief Complaint   Patient presents with    Back Pain     Lower back pain, for 3 weeks, denies recent injury, states fall in     Leg Pain     Bilateral leg pain     47 y.o. female with a PMH of Anxiety/Depression who presents to the Emergency Department with complaints of acute on chronic BLE pain and lower back pain.  Patient states that she has been seen numerous times for these complaints but is not taking any medications for her symptoms.  She states that she is waiting on a referral to Ortho at [facility] but has not been called yet.  She denies rash, fever, chest pain, SOB, numbness, weakness, tingling, abdominal pain, back pain, dysuria, hematuria, nausea, vomiting, diarrhea, or any other complaints.   She rates the pain as 10/10 and has had Ibuprofen earlier today with no relief.  No Alleviating/aggravating factors.                The history is provided by the patient.   Review of patient's allergies indicates:   Allergen Reactions    Codeine Shortness Of Breath and Hives    Iodine Shortness Of Breath    Penicillins Shortness Of Breath and Anaphylaxis    Shellfish containing products Shortness Of Breath and Swelling    Latex Rash    Latex, natural rubber Rash     Past Medical History:   Diagnosis Date    Anxiety disorder, unspecified     Depression     Fibroids      Past Surgical History:   Procedure Laterality Date    TONSILLECTOMY       History reviewed. No pertinent family history.  Social History     Tobacco Use    Smoking status: Former     Types: Cigarettes     Quit date: 2/10/2019     Years since quittin.2    Smokeless tobacco: Never   Substance Use Topics    Alcohol use: Not Currently    Drug use: No     Review of Systems   Constitutional:  Negative for chills and fever.   HENT:  Negative for congestion, ear pain, rhinorrhea, sore throat and trouble swallowing.    Eyes:  Negative for pain, discharge and redness.   Respiratory:  Negative for cough and  shortness of breath.    Cardiovascular:  Negative for chest pain.   Gastrointestinal:  Negative for abdominal pain, diarrhea, nausea and vomiting.   Genitourinary:  Negative for decreased urine volume and dysuria.   Musculoskeletal:  Positive for arthralgias and back pain. Negative for neck pain and neck stiffness.   Skin:  Negative for rash.   Neurological:  Negative for dizziness, weakness, light-headedness, numbness and headaches.   Psychiatric/Behavioral:  Negative for confusion.      Physical Exam     Initial Vitals [05/02/23 1822]   BP Pulse Resp Temp SpO2   (!) 151/90 83 19 98.7 °F (37.1 °C) 99 %      MAP       --         Physical Exam    Nursing note and vitals reviewed.  Constitutional: She appears well-developed.  Non-toxic appearance. She does not appear ill.   HENT:   Head: Normocephalic and atraumatic.   Right Ear: External ear normal.   Left Ear: External ear normal.   Nose: Nose normal.   Mouth/Throat: Oropharynx is clear and moist.   Eyes: Conjunctivae are normal.   Neck:   Normal range of motion.  Cardiovascular:  Normal rate and regular rhythm.           Pulmonary/Chest: Effort normal and breath sounds normal. She exhibits no tenderness.   Abdominal: Abdomen is soft. There is no abdominal tenderness.   Musculoskeletal:      Cervical back: Normal range of motion.      Lumbar back: Tenderness present.      Right upper leg: Tenderness present.      Left upper leg: Tenderness present.      Comments: Generalized tenderness to soft touch to lumbar area and bilateral thighs; no erythema, bruising, or rash; decreased ROM due to pain; normal strength and sensation; normal gait; +2 DP/PT pulses bilaterally     Neurological: She is alert and oriented to person, place, and time. Gait normal. GCS eye subscore is 4. GCS verbal subscore is 5. GCS motor subscore is 6.   Skin: Skin is warm, dry and intact. No rash noted.   Psychiatric: She has a normal mood and affect. Her speech is normal and behavior is normal.  Judgment and thought content normal.       ED Course   Procedures  Labs Reviewed   POCT URINALYSIS W/O SCOPE - Abnormal; Notable for the following components:       Result Value    Blood, UA 1+ (*)     All other components within normal limits   POCT URINE PREGNANCY   POCT URINALYSIS W/O SCOPE          Imaging Results              X-Ray Lumbar Spine Ap And Lateral (Final result)  Result time 05/02/23 21:29:50      Final result by Jareth Ott MD (05/02/23 21:29:50)                   Impression:      No acute lumbar spine abnormalities identified.      Electronically signed by: Jareth Ott MD  Date:    05/02/2023  Time:    21:29               Narrative:    EXAMINATION:  XR LUMBAR SPINE AP AND LATERAL    CLINICAL HISTORY:  back pain;    TECHNIQUE:  AP, lateral and spot images were performed of the lumbar spine.    COMPARISON:  None    FINDINGS:  Lumbar spine alignment is within normal limits.  No evidence of acute lumbar spine fracture or subluxation.  Mild multilevel intervertebral disc space narrowing is seen.  Visualized sacrum is unremarkable.                                       US Lower Extremity Veins Bilateral (Final result)  Result time 05/02/23 21:12:14      Final result by Jareth Ott MD (05/02/23 21:12:14)                   Impression:      No evidence of lower extremity deep venous thrombosis.      Electronically signed by: Jareth Ott MD  Date:    05/02/2023  Time:    21:12               Narrative:    EXAMINATION:  US LOWER EXTREMITY VEINS BILATERAL    CLINICAL HISTORY:  Pain in right leg    TECHNIQUE:  Duplex and color flow Doppler evaluation of the bilateral lower extremity veins was performed.    COMPARISON:  June 2022.    FINDINGS:  No evidence of clot involving the bilateral common femoral, greater saphenous, femoral, popliteal, peroneal, anterior and posterior tibial veins.  All venous structures demonstrate normal respiratory phasicity and augment adequately.  No evidence of soft  tissue mass or Baker's cyst.                                       Medications   ketorolac injection 30 mg (30 mg Intramuscular Given 5/2/23 2035)           APC / Resident Notes:   This is an evaluation of a 47 y.o. female that presents to the Emergency Department for back pain, BLE pain.   The patient is a non-toxic, afebrile, and well appearing female. On physical exam, there is Generalized tenderness to soft touch to lumbar area and bilateral thighs; no erythema, bruising, or rash; decreased ROM due to pain; normal strength and sensation; normal gait; +2 DP/PT pulses bilaterally    Vital Signs: 151/90, 98.7, 83, 19, 99%   If available, previous records reviewed.   I ordered labs and personally reviewed them.  Labs significant for UPT negative, UA negative for infection  I ordered X-rays and reviewed the radiologist interpretation.  Xray significant for no acute process  I ordered an US and reviewed the radiologist interpretation.  US significant for no DVT    My overall impression is Chronic low back pain, chronic bilateral leg pain.  I considered but doubt fracture, dislocation, laceration, cellulitis, septic joint, Gout, pregnancy, UTI    During her stay in the ED, the patient has been given toradol with good relief of her symptoms. The patient will be discharged home with Robaxin, gabapentin. Additional home care recommendations include Tylenol/Ibuprofen, Hydration. The diagnosis, treatment plan, instructions for follow-up, strict return precautions, and reevaluation with her PCP as well as ED return precautions have been discussed with the patient and she has verbalized an understanding of the information.  All questions or concerns from the patient have been addressed.     This case was discussed with and the patient has been  examined by my attending Dr. Sampson who is in agreement with my assessment and plan.    toradol                  Clinical Impression:   Final diagnoses:  [M79.604, M79.605] Leg pain,  bilateral - chronic (Primary)  [M54.50, G89.29] Chronic bilateral low back pain without sciatica        ED Disposition Condition    Discharge Stable          ED Prescriptions       Medication Sig Dispense Start Date End Date Auth. Provider    gabapentin (NEURONTIN) 300 MG capsule Take 1 capsule (300 mg total) by mouth 3 (three) times daily. for 15 days 45 capsule 5/2/2023 5/17/2023 KAMAR Mena    methocarbamoL (ROBAXIN) 500 MG Tab Take 2 tablets (1,000 mg total) by mouth 3 (three) times daily. for 5 days 30 tablet 5/2/2023 5/7/2023 KAMAR Mena          Follow-up Information       Follow up With Specialties Details Why Contact Info    Juan Saldivar MD Family Medicine Schedule an appointment as soon as possible for a visit in 2 days  [address] [facility] [address]  [phone]      [facility] ED Emergency Medicine Go to  If symptoms worsen [address] [facility] [address] [phone]             KAMAR Mena  05/02/23 3968

## 2023-05-03 NOTE — DISCHARGE INSTRUCTIONS

## 2023-06-15 ENCOUNTER — HOSPITAL ENCOUNTER (EMERGENCY)
Facility: HOSPITAL | Age: 48
Discharge: HOME | End: 2023-06-15
Attending: EMERGENCY MEDICINE
Payer: MEDICAID

## 2023-06-15 VITALS
HEART RATE: 83 BPM | OXYGEN SATURATION: 98 % | SYSTOLIC BLOOD PRESSURE: 161 MMHG | WEIGHT: 160 LBS | RESPIRATION RATE: 16 BRPM | BODY MASS INDEX: 25.71 KG/M2 | HEIGHT: 66 IN | DIASTOLIC BLOOD PRESSURE: 108 MMHG | TEMPERATURE: 98 F

## 2023-06-15 DIAGNOSIS — H60.502 ACUTE OTITIS EXTERNA OF LEFT EAR, UNSPECIFIED TYPE: ICD-10-CM

## 2023-06-15 DIAGNOSIS — J06.9 UPPER RESPIRATORY TRACT INFECTION, UNSPECIFIED TYPE: ICD-10-CM

## 2023-06-15 DIAGNOSIS — J01.90 ACUTE BACTERIAL SINUSITIS: Primary | ICD-10-CM

## 2023-06-15 DIAGNOSIS — B96.89 ACUTE BACTERIAL SINUSITIS: Primary | ICD-10-CM

## 2023-06-15 LAB
B-HCG UR QL: NEGATIVE
CTP QC/QA: YES
CTP QC/QA: YES
INFLUENZA A ANTIGEN, POC: NEGATIVE
INFLUENZA B ANTIGEN, POC: NEGATIVE
POC RAPID STREP A: NEGATIVE
SARS-COV-2 RDRP RESP QL NAA+PROBE: NEGATIVE

## 2023-06-15 PROCEDURE — 87804 INFLUENZA ASSAY W/OPTIC: CPT | Mod: 59,ER

## 2023-06-15 PROCEDURE — 99284 EMERGENCY DEPT VISIT MOD MDM: CPT | Mod: ER

## 2023-06-15 PROCEDURE — 25000003 PHARM REV CODE 250: Mod: ER

## 2023-06-15 PROCEDURE — 87635 SARS-COV-2 COVID-19 AMP PRB: CPT | Mod: ER | Performed by: EMERGENCY MEDICINE

## 2023-06-15 PROCEDURE — 81025 URINE PREGNANCY TEST: CPT | Mod: ER | Performed by: EMERGENCY MEDICINE

## 2023-06-15 RX ORDER — DOXYCYCLINE 100 MG/1
100 CAPSULE ORAL 2 TIMES DAILY
Qty: 14 CAPSULE | Refills: 0 | Status: SHIPPED | OUTPATIENT
Start: 2023-06-15 | End: 2023-06-22

## 2023-06-15 RX ORDER — BENZONATATE 100 MG/1
100 CAPSULE ORAL 3 TIMES DAILY PRN
Qty: 30 CAPSULE | Refills: 0 | Status: SHIPPED | OUTPATIENT
Start: 2023-06-15

## 2023-06-15 RX ORDER — ACETAMINOPHEN 500 MG
500 TABLET ORAL EVERY 6 HOURS PRN
Qty: 30 TABLET | Refills: 0 | Status: SHIPPED | OUTPATIENT
Start: 2023-06-15

## 2023-06-15 RX ORDER — GUAIFENESIN 100 MG/5ML
100-200 SOLUTION ORAL EVERY 4 HOURS PRN
Qty: 118 ML | Refills: 0 | Status: SHIPPED | OUTPATIENT
Start: 2023-06-15

## 2023-06-15 RX ORDER — ACETAMINOPHEN 500 MG
1000 TABLET ORAL
Status: COMPLETED | OUTPATIENT
Start: 2023-06-15 | End: 2023-06-15

## 2023-06-15 RX ORDER — IBUPROFEN 600 MG/1
600 TABLET ORAL EVERY 6 HOURS PRN
Qty: 30 TABLET | Refills: 0 | Status: SHIPPED | OUTPATIENT
Start: 2023-06-15 | End: 2023-07-21 | Stop reason: SDUPTHER

## 2023-06-15 RX ORDER — FLUTICASONE PROPIONATE 50 MCG
1 SPRAY, SUSPENSION (ML) NASAL 2 TIMES DAILY PRN
Qty: 15 G | Refills: 0 | Status: SHIPPED | OUTPATIENT
Start: 2023-06-15 | End: 2023-07-21 | Stop reason: SDUPTHER

## 2023-06-15 RX ADMIN — ACETAMINOPHEN 1000 MG: 500 TABLET, FILM COATED ORAL at 07:06

## 2023-06-15 NOTE — ED PROVIDER NOTES
Encounter Date: 6/15/2023    SCRIBE #1 NOTE: I, Bowen Rubio, am scribing for, and in the presence of,  Alayna Holdsworth, PA. I have scribed the following portions of the note - Other sections scribed: HPI, ROS.     History     Chief Complaint   Patient presents with    Cough    Headache     46 Y/O FEMALE PRESENTS TO THE ED WITH EAR PAIN, BODY ACHES, AND HEADACHE X 1 WEEK. THE PT HAS BEEN TAKING MUSCLE RELAXER AND IBUPROFEN WITHOUT ANY RELIEF. DENIES SOB, CP, AND WEAKNESS.      CC: Cough    HPI: Milagro Gomez is a 47 y.o. female who presents to the ED with hx of degenerative disc and depression for evaluation of productive cough onset a week ago. Pt complains of associated sneezing, rhinorrhea, sinus pressure, congestion, sweating, bilateral ear pain, generalized myalgia, headache, and PND. She notes she has been taking Motrin and Zyrtec with no relief. She denies recent known sick contact. She denies fever, dysuria, SOB, weakness, or other associated symptoms. She notes allergy to Codeine. Pt states she has a ride home from the ED.     The history is provided by the patient. No  was used.   Review of patient's allergies indicates:   Allergen Reactions    Codeine Shortness Of Breath and Hives    Iodine Shortness Of Breath    Penicillins Shortness Of Breath and Anaphylaxis    Shellfish containing products Shortness Of Breath and Swelling    Latex Rash    Latex, natural rubber Rash     Past Medical History:   Diagnosis Date    Anxiety disorder, unspecified     Depression     Fibroids      Past Surgical History:   Procedure Laterality Date    TONSILLECTOMY       No family history on file.  Social History     Tobacco Use    Smoking status: Former     Types: Cigarettes     Quit date: 2/10/2019     Years since quittin.3    Smokeless tobacco: Never   Substance Use Topics    Alcohol use: Not Currently    Drug use: No     Review of Systems   Constitutional:  Negative for chills, diaphoresis and  fever.   HENT:  Positive for congestion, ear pain, postnasal drip, rhinorrhea, sinus pressure and sneezing. Negative for ear discharge, sore throat and trouble swallowing.    Respiratory:  Positive for cough. Negative for shortness of breath.    Cardiovascular:  Negative for chest pain.   Gastrointestinal:  Negative for abdominal pain, constipation, diarrhea, nausea and vomiting.   Genitourinary:  Negative for decreased urine volume, difficulty urinating, dysuria, frequency, hematuria and urgency.   Musculoskeletal:  Positive for myalgias (body aches). Negative for back pain, neck pain and neck stiffness.   Skin:  Negative for rash.   Neurological:  Positive for headaches. Negative for dizziness, weakness, light-headedness and numbness.     Physical Exam     Initial Vitals [06/15/23 1806]   BP Pulse Resp Temp SpO2   (!) 161/108 83 16 98.3 °F (36.8 °C) 98 %      MAP       --         Physical Exam    Nursing note and vitals reviewed.  Constitutional: She appears well-developed and well-nourished. She is not diaphoretic. She is active. She does not appear ill. No distress.   HENT:   Head: Normocephalic and atraumatic.   Right Ear: Hearing, tympanic membrane, external ear and ear canal normal.   Left Ear: External ear normal. Tympanic membrane is perforated.   Nose: Right sinus exhibits maxillary sinus tenderness and frontal sinus tenderness. Left sinus exhibits maxillary sinus tenderness and frontal sinus tenderness.   Mouth/Throat: Uvula is midline. Posterior oropharyngeal erythema present. No oropharyngeal exudate, posterior oropharyngeal edema or tonsillar abscesses.   Erythematous left ear canal and perforated TM; patient states that is chronic.   Eyes: Conjunctivae, EOM and lids are normal. Pupils are equal, round, and reactive to light. Right eye exhibits no discharge. Left eye exhibits no discharge.   Neck: Neck supple.   Normal range of motion.   Full passive range of motion without pain.     Cardiovascular:   Normal rate and regular rhythm.           Pulmonary/Chest: Effort normal and breath sounds normal. No respiratory distress.   Abdominal: She exhibits no distension.   Musculoskeletal:         General: Normal range of motion.      Cervical back: Full passive range of motion without pain, normal range of motion and neck supple.     Neurological: She is alert and oriented to person, place, and time. GCS eye subscore is 4. GCS verbal subscore is 5. GCS motor subscore is 6.   Skin: Skin is dry. Capillary refill takes less than 2 seconds.   Psychiatric: Her mood appears anxious.       ED Course   Procedures  Labs Reviewed   POCT URINE PREGNANCY   SARS-COV-2 RDRP GENE    Narrative:     This test utilizes isothermal nucleic acid amplification technology to detect the SARS-CoV-2 RdRp nucleic acid segment. The analytical sensitivity (limit of detection) is 500 copies/swab.     A POSITIVE result is indicative of the presence of SARS-CoV-2 RNA; clinical correlation with patient history and other diagnostic information is necessary to determine patient infection status.    A NEGATIVE result means that SARS-CoV-2 nucleic acids are not present above the limit of detection. A NEGATIVE result should be treated as presumptive. It does not rule out the possibility of COVID-19 and should not be the sole basis for treatment decisions. If COVID-19 is strongly suspected based on clinical and exposure history, re-testing using an alternate molecular assay should be considered.     This test is only for use under the Food and Drug Administration s Emergency Use Authorization (EUA).     Commercial kits are provided by [employer]. Performance characteristics of the EUA have been independently verified by [facility] Department of Pathology and Laboratory Medicine.   _________________________________________________________________   The authorized Fact Sheet for Healthcare Providers and the authorized Fact Sheet for  Patients of the ID NOW COVID-19 are available on the FDA website:    https://www.fda.gov/media/473705/download      https://www.fda.gov/media/205733/download        POCT INFLUENZA A/B MOLECULAR   POCT STREP A MOLECULAR   POCT STREP A, RAPID   POCT RAPID INFLUENZA A/B          Imaging Results    None          Medications   acetaminophen tablet 1,000 mg (1,000 mg Oral Given 6/15/23 1902)     Medical Decision Making:   History:   Old Medical Records: I decided to obtain old medical records.  Initial Assessment:   47 y.o. female who presents to the ED with hx of degenerative disc and depression for evaluation of productive cough.  Patient's chart and medical history reviewed.  Differential Diagnosis:   COVID  Flu  Strep throat  Viral URI  Pneumonia  Sinusitis   AOM  Otitis externa   Clinical Tests:   Lab Tests: Ordered and Reviewed  ED Management:  Patient's vitals reviewed.  She is afebrile, no respiratory distress, nontoxic-appearing in the ED. Patient had TTP of frontal and maxillary sinuses and an erythematous left ear canal and perforated TM; patient states that is chronic.  Patient is very anxious.  Patient given Tylenol for pain.  UPT was negative.  Patient is COVID, flu, and strep negative.  Patient will be sent home with Doxycyline for acute sinusitis.  Patient will also be sent home with Motrin, Tylenol Flonase, guaifenesin cough syrup, Tessalon Perles, and benzocaine throat lozenges for symptomatic control.  Instructed patient to rest and stay well hydrated.  Patient will follow-up with her PCP. Patient agrees with this plan. Discussed with her strict return precautions, she verbalized understanding. Patient is stable for discharge.         Scribe Attestation:   Scribe #1: I performed the above scribed service and the documentation accurately describes the services I performed. I attest to the accuracy of the note.                 Scribe attestation: I, Alayna Holdsworth,PA-C, personally performed the  services described in this documentation.  All medical record entries made by the scribe were at my direction and in my presence.  I have reviewed the chart and agree that the record reflects my personal performance and is accurate and complete.    Clinical Impression:   Final diagnoses:  [H60.502] Acute otitis externa of left ear, unspecified type  [J01.90, B96.89] Acute bacterial sinusitis (Primary)  [J06.9] Upper respiratory tract infection, unspecified type        ED Disposition Condition    Discharge Stable          ED Prescriptions       Medication Sig Dispense Start Date End Date Auth. Provider    ibuprofen (ADVIL,MOTRIN) 600 MG tablet Take 1 tablet (600 mg total) by mouth every 6 (six) hours as needed for Pain or Temperature greater than. 30 tablet 6/15/2023 -- Alayna Holdsworth, PA-C    acetaminophen (TYLENOL) 500 MG tablet Take 1 tablet (500 mg total) by mouth every 6 (six) hours as needed for Pain or Temperature greater than. 30 tablet 6/15/2023 -- Alayna Holdsworth, PA-C    fluticasone propionate (FLONASE) 50 mcg/actuation nasal spray 1 spray (50 mcg total) by Each Nostril route 2 (two) times daily as needed for Rhinitis or Allergies. 15 g 6/15/2023 -- Alayna Holdsworth, PA-C    guaiFENesin 100 mg/5 ml (ROBITUSSIN) 100 mg/5 mL syrup Take 5-10 mLs (100-200 mg total) by mouth every 4 (four) hours as needed for Cough or Congestion. 118 mL 6/15/2023 -- Alayna Holdsworth, PA-C    doxycycline (VIBRAMYCIN) 100 MG Cap Take 1 capsule (100 mg total) by mouth 2 (two) times daily. for 7 days 14 capsule 6/15/2023 6/22/2023 Alayna Holdsworth, PA-C    benzonatate (TESSALON) 100 MG capsule Take 1 capsule (100 mg total) by mouth 3 (three) times daily as needed for Cough. 30 capsule 6/15/2023 -- Alayna Holdsworth, PA-C    benzocaine-menthoL 6-10 mg lozenge Take 1 lozenge by mouth every 2 (two) hours as needed for Pain (sore throat). 18 tablet 6/15/2023 -- Alayna Holdsworth, PA-C          Follow-up Information       Follow  up With Specialties Details Why Contact Info    Juan Saldivar MD Family Medicine   [address] [facility]  [place] [address]  [phone]               Alayna Holdsworth, PA-C  06/15/23 1946

## 2023-06-15 NOTE — DISCHARGE INSTRUCTIONS

## 2023-06-16 ENCOUNTER — HOSPITAL ENCOUNTER (EMERGENCY)
Facility: HOSPITAL | Age: 48
Discharge: PSYCHIATRIC OR SUBSTANCE USE FACILITY | End: 2023-06-17
Attending: EMERGENCY MEDICINE | Admitting: EMERGENCY MEDICINE
Payer: MEDICAID

## 2023-06-16 DIAGNOSIS — R19.7 NAUSEA, VOMITING, AND DIARRHEA: ICD-10-CM

## 2023-06-16 DIAGNOSIS — R45.851 SUICIDAL IDEATIONS: Primary | ICD-10-CM

## 2023-06-16 DIAGNOSIS — R10.84 GENERALIZED ABDOMINAL PAIN: ICD-10-CM

## 2023-06-16 DIAGNOSIS — N83.201 RIGHT OVARIAN CYST: ICD-10-CM

## 2023-06-16 DIAGNOSIS — K42.9 UMBILICAL HERNIA WITHOUT OBSTRUCTION AND WITHOUT GANGRENE: ICD-10-CM

## 2023-06-16 DIAGNOSIS — R11.2 NAUSEA, VOMITING, AND DIARRHEA: ICD-10-CM

## 2023-06-16 LAB
ALBUMIN SERPL-MCNC: 4.1 G/DL (ref 3.3–5.5)
ALBUMIN SERPL-MCNC: 4.4 G/DL (ref 3.3–5.5)
ALLENS TEST: ABNORMAL
ALP SERPL-CCNC: 58 U/L (ref 42–141)
ALP SERPL-CCNC: 60 U/L (ref 42–141)
AMPHET+METHAMPHET UR QL: NEGATIVE
APAP SERPL-MCNC: <3 UG/ML (ref 10–20)
B-HCG UR QL: NEGATIVE
BARBITURATES UR QL SCN>200 NG/ML: NEGATIVE
BENZODIAZ UR QL SCN>200 NG/ML: NEGATIVE
BILIRUB SERPL-MCNC: 0.5 MG/DL (ref 0.2–1.6)
BILIRUB SERPL-MCNC: 0.7 MG/DL (ref 0.2–1.6)
BILIRUBIN, POC UA: NEGATIVE
BLOOD, POC UA: ABNORMAL
BUN SERPL-MCNC: 7 MG/DL (ref 7–22)
BZE UR QL SCN: NEGATIVE
CALCIUM SERPL-MCNC: 9.9 MG/DL (ref 8–10.3)
CANNABINOIDS UR QL SCN: NEGATIVE
CHLORIDE SERPL-SCNC: 102 MMOL/L (ref 98–108)
CLARITY, POC UA: CLEAR
COLOR, POC UA: YELLOW
CREAT SERPL-MCNC: 0.7 MG/DL (ref 0.6–1.2)
CREAT UR-MCNC: 49.5 MG/DL (ref 15–325)
CTP QC/QA: YES
CTP QC/QA: YES
ETHANOL SERPL-MCNC: <10 MG/DL
GLUCOSE SERPL-MCNC: 99 MG/DL (ref 73–118)
GLUCOSE, POC UA: NEGATIVE
HCO3 UR-SCNC: 26.1 MMOL/L (ref 24–28)
KETONES, POC UA: ABNORMAL
LDH SERPL L TO P-CCNC: 0.64 MMOL/L (ref 0.5–2.2)
LEUKOCYTE EST, POC UA: NEGATIVE
METHADONE UR QL SCN>300 NG/ML: NEGATIVE
NITRITE, POC UA: NEGATIVE
OPIATES UR QL SCN: NEGATIVE
PCO2 BLDA: 45.6 MMHG (ref 35–45)
PCP UR QL SCN>25 NG/ML: NEGATIVE
PH SMN: 7.37 [PH] (ref 7.35–7.45)
PH UR STRIP: 6.5 [PH]
PO2 BLDA: 25 MMHG (ref 40–60)
POC ALT (SGPT): 13 U/L (ref 10–47)
POC ALT (SGPT): 19 U/L (ref 10–47)
POC AMYLASE: 38 U/L (ref 14–97)
POC AST (SGOT): 12 U/L (ref 11–38)
POC AST (SGOT): 21 U/L (ref 11–38)
POC BE: 0 MMOL/L
POC GGT: 9 U/L (ref 5–65)
POC SATURATED O2: 44 % (ref 95–100)
POC TCO2: 26 MMOL/L (ref 18–33)
POC TCO2: 27 MMOL/L (ref 24–29)
POTASSIUM BLD-SCNC: 3.8 MMOL/L (ref 3.6–5.1)
PROTEIN, POC UA: NEGATIVE
PROTEIN, POC: 7.7 G/DL (ref 6.4–8.1)
PROTEIN, POC: 8.1 G/DL (ref 6.4–8.1)
SAMPLE: ABNORMAL
SARS-COV-2 RDRP RESP QL NAA+PROBE: NEGATIVE
SITE: ABNORMAL
SODIUM BLD-SCNC: 140 MMOL/L (ref 128–145)
SPECIFIC GRAVITY, POC UA: 1.01
TOXICOLOGY INFORMATION: NORMAL
TSH SERPL DL<=0.005 MIU/L-ACNC: 1.22 UIU/ML (ref 0.4–4)
UROBILINOGEN, POC UA: 0.2 E.U./DL

## 2023-06-16 PROCEDURE — 63600175 PHARM REV CODE 636 W HCPCS: Mod: ER | Performed by: NURSE PRACTITIONER

## 2023-06-16 PROCEDURE — 25000003 PHARM REV CODE 250: Mod: ER | Performed by: EMERGENCY MEDICINE

## 2023-06-16 PROCEDURE — 80143 DRUG ASSAY ACETAMINOPHEN: CPT | Performed by: STUDENT IN AN ORGANIZED HEALTH CARE EDUCATION/TRAINING PROGRAM

## 2023-06-16 PROCEDURE — 25000003 PHARM REV CODE 250: Performed by: STUDENT IN AN ORGANIZED HEALTH CARE EDUCATION/TRAINING PROGRAM

## 2023-06-16 PROCEDURE — 84443 ASSAY THYROID STIM HORMONE: CPT | Performed by: STUDENT IN AN ORGANIZED HEALTH CARE EDUCATION/TRAINING PROGRAM

## 2023-06-16 PROCEDURE — 81025 URINE PREGNANCY TEST: CPT | Mod: ER | Performed by: NURSE PRACTITIONER

## 2023-06-16 PROCEDURE — 82150 ASSAY OF AMYLASE: CPT | Mod: ER

## 2023-06-16 PROCEDURE — 87635 SARS-COV-2 COVID-19 AMP PRB: CPT | Mod: ER | Performed by: NURSE PRACTITIONER

## 2023-06-16 PROCEDURE — 96375 TX/PRO/DX INJ NEW DRUG ADDON: CPT

## 2023-06-16 PROCEDURE — 80053 COMPREHEN METABOLIC PANEL: CPT | Mod: ER

## 2023-06-16 PROCEDURE — 82803 BLOOD GASES ANY COMBINATION: CPT | Mod: ER

## 2023-06-16 PROCEDURE — 80307 DRUG TEST PRSMV CHEM ANLYZR: CPT | Performed by: STUDENT IN AN ORGANIZED HEALTH CARE EDUCATION/TRAINING PROGRAM

## 2023-06-16 PROCEDURE — 96374 THER/PROPH/DIAG INJ IV PUSH: CPT

## 2023-06-16 PROCEDURE — 25000003 PHARM REV CODE 250: Mod: ER | Performed by: NURSE PRACTITIONER

## 2023-06-16 PROCEDURE — 85025 COMPLETE CBC W/AUTO DIFF WBC: CPT | Mod: ER

## 2023-06-16 PROCEDURE — 96361 HYDRATE IV INFUSION ADD-ON: CPT

## 2023-06-16 PROCEDURE — 82077 ASSAY SPEC XCP UR&BREATH IA: CPT | Performed by: STUDENT IN AN ORGANIZED HEALTH CARE EDUCATION/TRAINING PROGRAM

## 2023-06-16 PROCEDURE — 81003 URINALYSIS AUTO W/O SCOPE: CPT | Mod: 59,ER

## 2023-06-16 RX ORDER — CYCLOBENZAPRINE HCL 10 MG
10 TABLET ORAL
Status: COMPLETED | OUTPATIENT
Start: 2023-06-16 | End: 2023-06-16

## 2023-06-16 RX ORDER — TRAMADOL HYDROCHLORIDE 50 MG/1
50 TABLET ORAL
Status: COMPLETED | OUTPATIENT
Start: 2023-06-16 | End: 2023-06-16

## 2023-06-16 RX ORDER — KETOROLAC TROMETHAMINE 30 MG/ML
15 INJECTION, SOLUTION INTRAMUSCULAR; INTRAVENOUS
Status: COMPLETED | OUTPATIENT
Start: 2023-06-16 | End: 2023-06-16

## 2023-06-16 RX ORDER — AMLODIPINE BESYLATE 5 MG/1
5 TABLET ORAL
Status: DISCONTINUED | OUTPATIENT
Start: 2023-06-16 | End: 2023-06-17 | Stop reason: HOSPADM

## 2023-06-16 RX ORDER — ONDANSETRON 2 MG/ML
8 INJECTION INTRAMUSCULAR; INTRAVENOUS
Status: COMPLETED | OUTPATIENT
Start: 2023-06-16 | End: 2023-06-16

## 2023-06-16 RX ADMIN — KETOROLAC TROMETHAMINE 15 MG: 30 INJECTION, SOLUTION INTRAMUSCULAR; INTRAVENOUS at 03:06

## 2023-06-16 RX ADMIN — TRAMADOL HYDROCHLORIDE 50 MG: 50 TABLET, COATED ORAL at 10:06

## 2023-06-16 RX ADMIN — CYCLOBENZAPRINE 10 MG: 10 TABLET, FILM COATED ORAL at 06:06

## 2023-06-16 RX ADMIN — ONDANSETRON 8 MG: 2 INJECTION INTRAMUSCULAR; INTRAVENOUS at 03:06

## 2023-06-16 RX ADMIN — SODIUM CHLORIDE 1000 ML: 9 INJECTION, SOLUTION INTRAVENOUS at 03:06

## 2023-06-16 NOTE — ED NOTES
Patient refused to speak with Marti Bo during her treatment. She declined any family notification at this time.

## 2023-06-16 NOTE — ED PROVIDER NOTES
Encounter Date: 2023       History     Chief Complaint   Patient presents with    Abdominal Pain     Pt states generalized abd pain since last night pt states N/V/D present      47 y.o. female with a PMH of Anxiety, chronic back pain who presents to the Emergency Department with complaints of abd pain with N/V/D that started last night.  She denies rectal bleeding, rash, fever, chest pain, SOB, numbness, weakness, tingling, abdominal pain, back pain, dysuria, hematuria, nausea, vomiting, diarrhea, or any other complaints.   Of note, she was seen here on 6/15/2023 and diagnosed with a sinus infection and sent home on Ibuprofen, tylenol, flonase, robitussin, doxycycline, teassalon perles, and cepacol.  Patient states that she thinks her symptoms today are from the mucus that she is swallowing.  She rates the pain as 10/10 and has not taken any medications for the symptoms.  No Alleviating/aggravating factors.                The history is provided by the patient.   Review of patient's allergies indicates:   Allergen Reactions    Codeine Shortness Of Breath and Hives    Iodine Shortness Of Breath    Penicillins Shortness Of Breath and Anaphylaxis    Shellfish containing products Shortness Of Breath and Swelling    Latex Rash    Latex, natural rubber Rash     Past Medical History:   Diagnosis Date    Anxiety disorder, unspecified     Depression     Fibroids      Past Surgical History:   Procedure Laterality Date    TONSILLECTOMY       History reviewed. No pertinent family history.  Social History     Tobacco Use    Smoking status: Former     Types: Cigarettes     Quit date: 2/10/2019     Years since quittin.3    Smokeless tobacco: Never   Substance Use Topics    Alcohol use: Not Currently    Drug use: No     Review of Systems   Constitutional:  Negative for chills and fever.   HENT:  Negative for congestion, ear pain, rhinorrhea, sore throat and trouble swallowing.    Eyes:  Negative for pain, discharge and  "redness.   Respiratory:  Negative for cough and shortness of breath.    Cardiovascular:  Negative for chest pain.   Gastrointestinal:  Positive for abdominal pain, diarrhea, nausea and vomiting.   Genitourinary:  Negative for decreased urine volume and dysuria.   Musculoskeletal:  Negative for back pain, neck pain and neck stiffness.   Skin:  Negative for rash.   Neurological:  Negative for dizziness, weakness, light-headedness, numbness and headaches.   Psychiatric/Behavioral:  Negative for confusion.      Physical Exam     Initial Vitals [06/16/23 1355]   BP Pulse Resp Temp SpO2   (!) 130/91 82 20 98 °F (36.7 °C) 98 %      MAP       --         Physical Exam    Nursing note and vitals reviewed.  Constitutional: She appears well-developed.  Non-toxic appearance. She does not appear ill.   HENT:   Head: Normocephalic and atraumatic.   Right Ear: External ear normal.   Left Ear: External ear normal.   Eyes: Conjunctivae are normal.   Neck:   Normal range of motion.  Cardiovascular:  Normal rate and regular rhythm.           Pulmonary/Chest: Effort normal and breath sounds normal. She exhibits no tenderness.   Abdominal: Abdomen is soft. Bowel sounds are normal. She exhibits no distension. There is no abdominal tenderness.   No right CVA tenderness.  No left CVA tenderness. There is no rebound and no guarding.   Musculoskeletal:      Cervical back: Normal range of motion.     Neurological: She is alert and oriented to person, place, and time. Gait normal. GCS eye subscore is 4. GCS verbal subscore is 5. GCS motor subscore is 6.   Skin: Skin is warm, dry and intact. No rash noted.   Psychiatric: Her speech is normal and behavior is normal. Judgment normal. Her affect is blunt. She expresses suicidal ideation.   17:08: at bedside, patient tearful with flat affect.  Patient states that she no longer wishes to live and has been thinking of suicide for "a while".  Reports active SI at present.         ED Course "   Procedures  Labs Reviewed   POCT URINALYSIS W/O SCOPE - Abnormal; Notable for the following components:       Result Value    Ketones, UA 3+ (*)     Blood, UA 2+ (*)     All other components within normal limits   ISTAT PROCEDURE - Abnormal; Notable for the following components:    POC PCO2 45.6 (*)     POC PO2 25 (*)     POC SATURATED O2 44 (*)     All other components within normal limits   POCT CBC   POCT URINALYSIS W/O SCOPE   POCT URINE PREGNANCY   SARS-COV-2 RDRP GENE   POCT CMP   POCT LIVER PANEL   POCT LIVER PANEL   POCT CMP                  Imaging Results              CT Abdomen Pelvis  Without Contrast (Final result)  Result time 06/16/23 17:37:30      Final result by Fox Hood MD (06/16/23 17:37:30)                   Impression:      1. No acute abnormality.  2. Probable 3.7 cm right ovarian cyst.  Recommend surveillance.  3. Minimal fat containing umbilical hernia.      Electronically signed by: Fox Hood  Date:    06/16/2023  Time:    17:37               Narrative:    EXAMINATION:  CT ABDOMEN PELVIS WITHOUT CONTRAST    CLINICAL HISTORY:  Abdominal pain, acute, nonlocalized;    TECHNIQUE:  Low dose axial images, sagittal and coronal reformations were obtained from the lung bases to the pubic symphysis, Oral contrast was not administered.    COMPARISON:  None    FINDINGS:  Heart: Normal in size. No pericardial effusion.    Lung Bases: Well aerated, without consolidation or pleural fluid.    Liver: Normal in size and attenuation, with no focal hepatic lesions.    Gallbladder: No calcified gallstones.    Bile Ducts: No evidence of dilated ducts.    Pancreas: No mass or peripancreatic fat stranding.    Spleen: Unremarkable.    Adrenals: Unremarkable.    Kidneys/ Ureters: No stone, mass, hydronephrosis or hydroureter bilaterally.    Bladder: No evidence of wall thickening.    Reproductive organs: The uterus is midline.  Probable 3.7 cm ovarian cyst on the right.  Recommend surveillance.    GI  "Tract/Mesentery: No evidence of bowel obstruction or inflammation.    Peritoneal Space: No ascites. No free air.    Retroperitoneum: No significant adenopathy.    Abdominal wall: Minimal fat containing umbilical hernia.    Vasculature: No evidence of aortic aneurysm.    The appendix is within normal limits.    Bones: No acute fracture.                                       Medications   sodium chloride 0.9% bolus 1,000 mL 1,000 mL (0 mLs Intravenous Stopped 6/16/23 1640)   ondansetron injection 8 mg (8 mg Intravenous Given 6/16/23 1515)   ketorolac injection 15 mg (15 mg Intravenous Given 6/16/23 1516)           APC / Resident Notes:   This is an urgent evaluation of a 47 y.o. female that presents to the Emergency Department for Abdominal Pain. Associated symptoms include N/V/D. The patient is a non-toxic, afebrile, and well appearing female. On physical exam, there is soft non-tender abdomen with no guarding or rebound; normal bowel sounds; she has no distention.     Vital Signs: 160/92, 98, 88, 18, 100%   If available, previous records reviewed.   I ordered labs and personally reviewed them.  Labs significant for UPT and UA negative; COVID and Flu negative 6/15/2023; amylase 38, BUN 7, creatinine 0.7, WBC 8.5, H&H 14&43, plt 247, lactic acid 0.64  I ordered CT scan and reviewed the radiologist interpretation.  CT significant for No acute abnormality; Probable 3.7 cm right ovarian cyst.  Recommend surveillance; Minimal fat containing umbilical hernia      17:08: at bedside, patient tearful with flat affect.  Patient states that she no longer wishes to live and has been thinking of suicide for "a while".  Reports active SI at present.  Discussed SI with patient and she admits to prior psych admissions.  Explained PEC process, patient verbalizes understanding.  Questions answered.  Dr. Horton at bedside for examination and PEC.    17:10: Discussed patient with Dr. Cohen at [facility], patient will be transferred " for psych clearance then possible pysch admission    This case was discussed with and the patient has been examined by my attending Dr. Horton who is in agreement with my assessment and plan.                         Clinical Impression:   Final diagnoses:  [R45.851] Suicidal ideations (Primary)  [R10.84] Generalized abdominal pain  [R11.2, R19.7] Nausea, vomiting, and diarrhea  [K42.9] Umbilical hernia without obstruction and without gangrene  [N83.201] Right ovarian cyst        ED Disposition Condition    Transfer to Another Facility Stable                KAMAR Mena  06/16/23 4139

## 2023-06-16 NOTE — ED TRIAGE NOTES
Pt arrived to the ED via personal transport due to abdominal pain x2 days. Pt reports LLQ abd pain of 8/10 with associated nausea and vomiting. Pt denies taking pain meds pta. Denies abd hx/sx. Alert and oriented x4.

## 2023-06-17 VITALS
HEART RATE: 86 BPM | SYSTOLIC BLOOD PRESSURE: 126 MMHG | RESPIRATION RATE: 19 BRPM | OXYGEN SATURATION: 95 % | BODY MASS INDEX: 25.71 KG/M2 | TEMPERATURE: 98 F | HEIGHT: 66 IN | DIASTOLIC BLOOD PRESSURE: 77 MMHG | WEIGHT: 160 LBS

## 2023-06-17 PROCEDURE — 63600175 PHARM REV CODE 636 W HCPCS: Performed by: STUDENT IN AN ORGANIZED HEALTH CARE EDUCATION/TRAINING PROGRAM

## 2023-06-17 PROCEDURE — 99285 EMERGENCY DEPT VISIT HI MDM: CPT

## 2023-06-17 PROCEDURE — 99215 OFFICE O/P EST HI 40 MIN: CPT | Mod: AF,HB,95, | Performed by: PSYCHIATRY & NEUROLOGY

## 2023-06-17 PROCEDURE — 96376 TX/PRO/DX INJ SAME DRUG ADON: CPT

## 2023-06-17 PROCEDURE — 25000003 PHARM REV CODE 250: Performed by: STUDENT IN AN ORGANIZED HEALTH CARE EDUCATION/TRAINING PROGRAM

## 2023-06-17 PROCEDURE — 99215 PR OFFICE/OUTPT VISIT, EST, LEVL V, 40-54 MIN: ICD-10-PCS | Mod: AF,HB,95, | Performed by: PSYCHIATRY & NEUROLOGY

## 2023-06-17 RX ORDER — KETOROLAC TROMETHAMINE 30 MG/ML
15 INJECTION, SOLUTION INTRAMUSCULAR; INTRAVENOUS
Status: COMPLETED | OUTPATIENT
Start: 2023-06-17 | End: 2023-06-17

## 2023-06-17 RX ORDER — ACETAMINOPHEN 500 MG
1000 TABLET ORAL
Status: COMPLETED | OUTPATIENT
Start: 2023-06-17 | End: 2023-06-17

## 2023-06-17 RX ADMIN — ACETAMINOPHEN 1000 MG: 500 TABLET ORAL at 04:06

## 2023-06-17 RX ADMIN — KETOROLAC TROMETHAMINE 15 MG: 30 INJECTION, SOLUTION INTRAMUSCULAR; INTRAVENOUS at 12:06

## 2023-06-17 NOTE — ED NOTES
Patient visualized, resting quietly on stretcher.  No acute distress noted.  Respirations even and unlabored.  Sitter at bedside for strict 1:1 visual contact per policy.  Safety and security precautions maintained.   Patient with verbal understanding of plan of care.  Patient up to restroom at this time. Verbalizing questions about her CT scan.  Will have provider to discuss results with patient as soon as available.  Will continue to closely monitor patient.

## 2023-06-17 NOTE — ED NOTES
Pt arrived from [facility] for psych eval. Original PEC with patient. Dr. Cohen made aware of patients' arrival. Security at bedside. Sitter and nurse at bedside.

## 2023-06-17 NOTE — CONSULTS
"[facility]  Psychiatry  Telepsychiatry Consult Note    Please see previous notes:    Patient agreeable to consultation via telepsychiatry.    Tele-Consultation from Psychiatry started: 6/17/2023 at 12:05am  The chief complaint leading to psychiatric consultation is: SI  This consultation was requested by Dr Ye, the Emergency Department attending physician.  The location of the consulting psychiatrist is  [place] .  The patient location is  [facility] EMERGENCY DEPARTMENT   The patient arrived at the ED at: [facility]    Also present with the patient at the time of the consultation: nobody    Patient Identification:   Milagro Gomez is a 47 y.o. female.    Patient information was obtained from patient and past medical records.  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    Per ED note -"Milagro Gomez is a 47 y.o female with a PMHx of anxiety, and depression, who presents to the ED for evaluation of suicidal ideations. Patient is a transfer from [facility] ED at [facility] where she was initially seen for abdominal pain and then endorsed suicidal ideations. She currently reports LLQ abdominal pain, and a subjective fever. She further reports SI, stating she is "tired of all this pain." She notes she has been previously sent to a psych facility, but denies compliance with psychiatric medication. No medications taken PTA. No alleviating or exacerbating factors noted. Denies homicidal ideas, hallucinations, or other associated symptoms. Allergic to codeine, iodine, penicillins, latex. "    On interview, patient reports increased depression in context of pain in legs due to sciatica, respiratory infection. Reports her back pain worsened over the past month. Reports depressive worsened in this " "context. She had been off depression medications prior to this. Reports she also had SI which occurred in this context. Reports she has been contemplated. Reports ongoing depressed mood, anhedonia, trouble sleeping, fair appetite, increased anxiety, trouble focusing because of the pain. Reports her life "was good" prior to onset of worsened pain this past month. Reports the tramadol has helped partially with the pain.   No AVH  No HI  No manic sx  This is the extent of patients complaints at this time  12 pt ros was negative aside from sx noted above  Most recent antidepressant was zoloft which did help but has not taken in months ago. Was getting twitches from it which interrupted her sleep.      Per prior assessment and updated where indicated  Psychiatric History:   Previous Psychiatric Hospitalizations: Yes   Previous Medication Trials: Yes , prozac- did not work, buspar- did not like, gabapentin- made me tired, seroquel- made her sick, abilify- "not for me", lamictal- never tried, vraylar- never tried, lithium-never tried, zoloft - muscle twitches  Previous Suicide Attempts: no  History of Violence: no  History of Depression: yes  History of Veronica: no  History of Auditory/Visual Hallucination no  History of Delusions: no  Outpatient psychiatrist (current & past): none      Substance Abuse History:  Tobacco:No  Alcohol: No  Illicit Substances:No  Detox/Rehab: No     Legal History: Past charges/incarcerations: No      Family Psychiatric History: father-bipolar  Brother-bipolar        Social History: living with fiance in an apartment. Denied access to firearms.  Applying for disability.  No  hx  6 children, not living with her    Psychiatric Mental Status Exam:  Arousal: alert  Sensorium/Orientation: oriented to grossly intact  Behavior/Cooperation: normal, cooperative   Speech: normal tone, normal rate, normal pitch, normal volume  Language: grossly intact  Mood: " depressed "   Affect: constricted, " teary eyed  Thought Process: normal and logical  Thought Content:   Auditory hallucinations: NO  Visual hallucinations: NO  Paranoia: NO  Delusions:  NO  Suicidal ideation: YES:      Homicidal ideation: NO  Attention/Concentration:  intact  Memory:    Recent:  Intact   Remote: Intact     Fund of Knowledge: Aware of current events   Abstract reasoning: similarities were abstract  Insight: has awareness of illness  Judgment: behavior is adequate to circumstances      Past Medical History:   Past Medical History:   Diagnosis Date    Anxiety disorder, unspecified     Depression     Fibroids       Laboratory Data:   Labs Reviewed   ACETAMINOPHEN LEVEL - Abnormal; Notable for the following components:       Result Value    Acetaminophen (Tylenol), Serum <3.0 (*)     All other components within normal limits   POCT URINALYSIS W/O SCOPE - Abnormal; Notable for the following components:    Ketones, UA 3+ (*)     Blood, UA 2+ (*)     All other components within normal limits   ISTAT PROCEDURE - Abnormal; Notable for the following components:    POC PCO2 45.6 (*)     POC PO2 25 (*)     POC SATURATED O2 44 (*)     All other components within normal limits   TSH   ALCOHOL,MEDICAL (ETHANOL)   DRUG SCREEN PANEL, URINE EMERGENCY    Narrative:     Specimen Source->Urine   POCT CBC   POCT URINALYSIS W/O SCOPE   POCT URINE PREGNANCY   SARS-COV-2 RDRP GENE    Narrative:     This test utilizes isothermal nucleic acid amplification technology to detect the SARS-CoV-2 RdRp nucleic acid segment. The analytical sensitivity (limit of detection) is 500 copies/swab.     A POSITIVE result is indicative of the presence of SARS-CoV-2 RNA; clinical correlation with patient history and other diagnostic information is necessary to determine patient infection status.    A NEGATIVE result means that SARS-CoV-2 nucleic acids are not present above the limit of detection. A NEGATIVE result should be treated as presumptive. It does not rule out the  possibility of COVID-19 and should not be the sole basis for treatment decisions. If COVID-19 is strongly suspected based on clinical and exposure history, re-testing using an alternate molecular assay should be considered.     This test is only for use under the Food and Drug Administration s Emergency Use Authorization (EUA).     Commercial kits are provided by [employer] Performance characteristics of the EUA have been independently verified by [facility] Department of Pathology and Laboratory Medicine.   _________________________________________________________________   The authorized Fact Sheet for Healthcare Providers and the authorized Fact Sheet for Patients of the ID NOW COVID-19 are available on the FDA website:    https://www.fda.gov/media/853619/download      https://www.fda.gov/media/865910/download      POCT CMP   POCT LIVER PANEL   POCT LIVER PANEL   POCT CMP       Allergies:   Review of patient's allergies indicates:   Allergen Reactions    Codeine Shortness Of Breath and Hives    Iodine Shortness Of Breath    Penicillins Shortness Of Breath and Anaphylaxis    Shellfish containing products Shortness Of Breath and Swelling    Latex Rash    Latex, natural rubber Rash       Medications in ER:   Medications   amLODIPine tablet 5 mg (0 mg Oral Hold 6/16/23 2215)   sodium chloride 0.9% bolus 1,000 mL 1,000 mL (0 mLs Intravenous Stopped 6/16/23 1640)   ondansetron injection 8 mg (8 mg Intravenous Given 6/16/23 1515)   ketorolac injection 15 mg (15 mg Intravenous Given 6/16/23 1516)   cyclobenzaprine tablet 10 mg (10 mg Oral Given 6/16/23 1805)   traMADoL tablet 50 mg (50 mg Oral Given 6/16/23 2223)       Medications at home: reviewed with patient and in MAR    No new subjective & objective note has been filed under this hospital service since the last note was generated.      Assessment - Diagnosis - Goals:     Diagnosis/Impression:   MDD- severe recurrent without psychotic  features    Rec:   Recommend PEC given risk of harm to self. Inpatient psychiatric tx once medically cleared.  Ativan 2mg IV/IM q 4 hours prn severe agitation or anxiety.  Will defer to inpatient psychiatric team to start/modify scheduled medications    Time with patient: 23min      More than 50% of the time was spent counseling/coordinating care    Consulting clinician was informed of the encounter and consult note.    Consultation ended: 6/17/2023 at 12:41am    Rupesh Hightower MD  Psychiatry  [facility]

## 2023-06-17 NOTE — ED NOTES
Patient visualized, resting quietly on stretcher.  No acute distress noted.  Respirations even and unlabored.  Sitter at bedside for strict 1:1 visual contact per policy.  Safety and security precautions maintained.   ED approved meal tray provided upon patient request.  Patient with verbal understanding of plan of care.  Will continue to closely monitor patient.

## 2023-06-17 NOTE — ED PROVIDER NOTES
"Encounter Date: 2023    SCRIBE #1 NOTE: I, Ezekiel Yeboah, am scribing for, and in the presence of,  Corey Ye MD. I have scribed the following portions of the note - Other sections scribed: HPI, ROS, PE.     History     Chief Complaint   Patient presents with    Abdominal Pain     Pt states generalized abd pain since last night pt states N/V/D present      Milagro Gomez is a 47 y.o female with a PMHx of anxiety, and depression, who presents to the ED for evaluation of suicidal ideations. Patient is a transfer from [facility] ED at [place] where she was initially seen for abdominal pain and then endorsed suicidal ideations. She currently reports LLQ abdominal pain, and a subjective fever. She further reports SI, stating she is "tired of all this pain." She notes she has been previously sent to a psych facility, but denies compliance with psychiatric medication. No medications taken PTA. No alleviating or exacerbating factors noted. Denies homicidal ideas, hallucinations, or other associated symptoms. Allergic to codeine, iodine, penicillins, latex.     The history is provided by the patient. No  was used.   Review of patient's allergies indicates:   Allergen Reactions    Codeine Shortness Of Breath and Hives    Iodine Shortness Of Breath    Penicillins Shortness Of Breath and Anaphylaxis    Shellfish containing products Shortness Of Breath and Swelling    Latex Rash    Latex, natural rubber Rash     Past Medical History:   Diagnosis Date    Anxiety disorder, unspecified     Depression     Fibroids      Past Surgical History:   Procedure Laterality Date    TONSILLECTOMY       History reviewed. No pertinent family history.  Social History     Tobacco Use    Smoking status: Former     Types: Cigarettes     Quit date: 2/10/2019     Years since quittin.3    Smokeless tobacco: Never   Substance Use Topics    Alcohol use: Not Currently    Drug use: No     Review of " Systems   Constitutional:  Positive for fever (subjective). Negative for chills.   HENT:  Negative for facial swelling and sore throat.    Eyes:  Negative for visual disturbance.   Respiratory:  Negative for cough and shortness of breath.    Cardiovascular:  Negative for chest pain and palpitations.   Gastrointestinal:  Positive for abdominal pain (LLQ). Negative for nausea and vomiting.   Genitourinary:  Negative for dysuria and hematuria.   Musculoskeletal:  Negative for back pain.   Skin:  Negative for rash.   Neurological:  Negative for weakness and headaches.   Hematological:  Does not bruise/bleed easily.   Psychiatric/Behavioral:  Positive for suicidal ideas. Negative for hallucinations.         (-) homicidal ideas     Physical Exam     Initial Vitals [06/16/23 1355]   BP Pulse Resp Temp SpO2   (!) 130/91 82 20 98 °F (36.7 °C) 98 %      MAP       --         Physical Exam    Nursing note and vitals reviewed.  Constitutional: She appears well-developed and well-nourished. She is not diaphoretic. No distress.   HENT:   Head: Normocephalic and atraumatic.   Right Ear: External ear normal.   Left Ear: External ear normal.   Nose: Nose normal.   Eyes: Conjunctivae are normal. No scleral icterus.   Neck: Neck supple. No tracheal deviation present.   Normal range of motion.  Cardiovascular:  Normal rate, regular rhythm and normal heart sounds.           Pulmonary/Chest: Breath sounds normal. No respiratory distress.   Abdominal: Abdomen is soft. Bowel sounds are normal. She exhibits no distension. There is no abdominal tenderness.   No right CVA tenderness.  No left CVA tenderness. There is no rebound and no guarding.   Musculoskeletal:      Cervical back: Normal range of motion and neck supple.     Neurological: She is alert and oriented to person, place, and time.   Skin: Skin is warm and dry.   Psychiatric: Thought content normal. She exhibits a depressed mood.   Flat affect.       ED Course   Procedures  Labs  Reviewed   ACETAMINOPHEN LEVEL - Abnormal; Notable for the following components:       Result Value    Acetaminophen (Tylenol), Serum <3.0 (*)     All other components within normal limits   POCT URINALYSIS W/O SCOPE - Abnormal; Notable for the following components:    Ketones, UA 3+ (*)     Blood, UA 2+ (*)     All other components within normal limits   ISTAT PROCEDURE - Abnormal; Notable for the following components:    POC PCO2 45.6 (*)     POC PO2 25 (*)     POC SATURATED O2 44 (*)     All other components within normal limits   TSH   ALCOHOL,MEDICAL (ETHANOL)   DRUG SCREEN PANEL, URINE EMERGENCY    Narrative:     Specimen Source->Urine   POCT CBC   POCT URINALYSIS W/O SCOPE   POCT URINE PREGNANCY   SARS-COV-2 RDRP GENE    Narrative:     This test utilizes isothermal nucleic acid amplification technology to detect the SARS-CoV-2 RdRp nucleic acid segment. The analytical sensitivity (limit of detection) is 500 copies/swab.     A POSITIVE result is indicative of the presence of SARS-CoV-2 RNA; clinical correlation with patient history and other diagnostic information is necessary to determine patient infection status.    A NEGATIVE result means that SARS-CoV-2 nucleic acids are not present above the limit of detection. A NEGATIVE result should be treated as presumptive. It does not rule out the possibility of COVID-19 and should not be the sole basis for treatment decisions. If COVID-19 is strongly suspected based on clinical and exposure history, re-testing using an alternate molecular assay should be considered.     This test is only for use under the Food and Drug Administration s Emergency Use Authorization (EUA).     Commercial kits are provided by [employer]. Performance characteristics of the EUA have been independently verified by [facility] Department of Pathology and Laboratory Medicine.   _________________________________________________________________   The authorized Fact Sheet  for Healthcare Providers and the authorized Fact Sheet for Patients of the ID NOW COVID-19 are available on the FDA website:    https://www.fda.gov/media/240214/download      https://www.fda.gov/media/410154/download      POCT CMP   POCT LIVER PANEL   POCT LIVER PANEL   POCT CMP          Imaging Results              CT Abdomen Pelvis  Without Contrast (Final result)  Result time 06/16/23 17:37:30      Final result by Fox Hood MD (06/16/23 17:37:30)                   Impression:      1. No acute abnormality.  2. Probable 3.7 cm right ovarian cyst.  Recommend surveillance.  3. Minimal fat containing umbilical hernia.      Electronically signed by: Fox Hood  Date:    06/16/2023  Time:    17:37               Narrative:    EXAMINATION:  CT ABDOMEN PELVIS WITHOUT CONTRAST    CLINICAL HISTORY:  Abdominal pain, acute, nonlocalized;    TECHNIQUE:  Low dose axial images, sagittal and coronal reformations were obtained from the lung bases to the pubic symphysis, Oral contrast was not administered.    COMPARISON:  None    FINDINGS:  Heart: Normal in size. No pericardial effusion.    Lung Bases: Well aerated, without consolidation or pleural fluid.    Liver: Normal in size and attenuation, with no focal hepatic lesions.    Gallbladder: No calcified gallstones.    Bile Ducts: No evidence of dilated ducts.    Pancreas: No mass or peripancreatic fat stranding.    Spleen: Unremarkable.    Adrenals: Unremarkable.    Kidneys/ Ureters: No stone, mass, hydronephrosis or hydroureter bilaterally.    Bladder: No evidence of wall thickening.    Reproductive organs: The uterus is midline.  Probable 3.7 cm ovarian cyst on the right.  Recommend surveillance.    GI Tract/Mesentery: No evidence of bowel obstruction or inflammation.    Peritoneal Space: No ascites. No free air.    Retroperitoneum: No significant adenopathy.    Abdominal wall: Minimal fat containing umbilical hernia.    Vasculature: No evidence of aortic  aneurysm.    The appendix is within normal limits.    Bones: No acute fracture.                                       Medications   amLODIPine tablet 5 mg (0 mg Oral Hold 6/16/23 2215)   sodium chloride 0.9% bolus 1,000 mL 1,000 mL (0 mLs Intravenous Stopped 6/16/23 1640)   ondansetron injection 8 mg (8 mg Intravenous Given 6/16/23 1515)   ketorolac injection 15 mg (15 mg Intravenous Given 6/16/23 1516)   cyclobenzaprine tablet 10 mg (10 mg Oral Given 6/16/23 1805)   traMADoL tablet 50 mg (50 mg Oral Given 6/16/23 2223)   ketorolac injection 15 mg (15 mg Intravenous Given 6/17/23 0053)     Medical Decision Making:   History:   Old Medical Records: I decided to obtain old medical records.  Old Records Summarized: other records.       <> Summary of Records: External documents reviewed.  Clinical Tests:   Lab Tests: Ordered and Reviewed  Radiological Study: Ordered and Reviewed        Scribe Attestation:   Scribe #1: I performed the above scribed service and the documentation accurately describes the services I performed. I attest to the accuracy of the note.      ED Course as of 06/17/23 0054   Fri Jun 16, 2023   2259 CT Abdomen Pelvis  Without Contrast     Impression:     1. No acute abnormality.  2. Probable 3.7 cm right ovarian cyst.  Recommend surveillance.  3. Minimal fat containing umbilical hernia.    [CC]   Sat Jun 17, 2023   0050 Patient is medically cleared for psychiatric evaluation.  Patient given amlodipine with reduction of blood pressure. [CC]   0054 I spoke with Dr. Hightower who recommends continuing pec given patient's severe depression with suicide ideation and plan to OD.  Plan to transfer for psychiatric evaluation.  Laboratory workup reassuring in regards to abdominal pain.  She notes chronic abdominal pain.  Vitals stable doubt serious surgical emergency. [CC]      ED Course User Index  [CC] Corey Ye MD       Medically cleared for psychiatry placement: 6/16/2023 11:18 PM         Clinical  Impression:   Final diagnoses:  [R45.851] Suicidal ideations (Primary)  [R10.84] Generalized abdominal pain  [R11.2, R19.7] Nausea, vomiting, and diarrhea  [K42.9] Umbilical hernia without obstruction and without gangrene  [N83.201] Right ovarian cyst        ED Disposition Condition    Transfer to [facility] Stable          ED Prescriptions    None       Follow-up Information    None         I, Corey Ye MD, personally performed the services described in this documentation. All medical record entries made by the scribe were at my direction and in my presence. I have reviewed the chart and agree that the record reflects my personal performance and is accurate and complete.       Corey Ye MD  06/17/23 0055

## 2023-06-17 NOTE — ED NOTES
Patient visualized, resting quietly on stretcher.  No acute distress noted.  Respirations even and unlabored.  Sitter at bedside for strict 1:1 visual contact per policy.  Safety and security precautions maintained.   Patient with verbal understanding of plan of care.  Patient reports that she is still having abdominal pain at this time.  Patient was given toradol at 1516 and flexeril at 1805.  ED attending provider notified with no new orders noted at this time.  Will continue to closely monitor patient.

## 2023-06-17 NOTE — ED NOTES
RECEIVED REPORT FROM JESUS CHARGE NURSE WHO REPORTED THAT PT HAD COME IN WITH ABDOMINAL PAIN AND UPON HEARING THAT THEY COULD NOT FIND A REASON FOR HER PAIN OTHER THEN AN OVARIAN CYST PT VERBALIZED TO NP THAT SHE WOULD GO HOME AND KILL HERSELF.  NP ASKED HER IF SHE WAS INDEED HAVING SUICIDAL IDEATION AND SHE VERBALIZED THAT YES SHE WAS.

## 2023-06-17 NOTE — ED NOTES
[facility] service here at this time to transport patient to parent facility at this time. Report of patient's condition provided per nurse.

## 2023-06-17 NOTE — ED NOTES
"Pt states LLQ 8/10 severity. When inquired about SI, pt said "I just don't want to live with these problems anymore" denies specific plan at this time. Pt is calm, cooperative, and withdrawn. Sitter is at bedside.    "

## 2023-06-17 NOTE — ED NOTES
"RN currently at patient bedside. Pt currently teary-eyed and frustrated with personal life. Pt reports being tired and mentally exhausted. Pt states "If I go home alone, no telling what I will do." RN provided support and comfort at this time. Pt also states, "Im tired of being here, I feel my health deteriorating. I don't have family. I have nothing."     RN immediately reported to provider Estee Bass NP. Estee immediately reported to patient bedside to reassess and reevaluate patient condition.   "

## 2023-06-17 NOTE — ED NOTES
Patient visualized, resting quietly on stretcher.  No acute distress noted.  Respirations even and unlabored.  Sitter at bedside for strict 1:1 visual contact per policy.  Safety and security precautions maintained.   ED attending provider at bedside at this time discussing results from labs and imaging.  Patient with verbal understanding of plan of care.  Will continue to closely monitor patient.

## 2023-06-17 NOTE — ED NOTES
Patient visualized, resting quietly on stretcher.  No acute distress noted.  Respirations even and unlabored.  Sitter at bedside for strict 1:1 visual contact per policy.  Safety and security precautions maintained.   Patient with verbal understanding of plan of care.  Will continue to closely monitor patient.

## 2023-06-18 PROBLEM — J06.9 UPPER RESPIRATORY INFECTION: Status: ACTIVE | Noted: 2023-06-18

## 2023-06-18 PROBLEM — Z13.9 ENCOUNTER FOR MEDICAL SCREENING EXAMINATION: Status: ACTIVE | Noted: 2023-06-18

## 2023-07-12 ENCOUNTER — HOSPITAL ENCOUNTER (EMERGENCY)
Facility: HOSPITAL | Age: 48
Discharge: HOME | End: 2023-07-13
Attending: EMERGENCY MEDICINE | Admitting: EMERGENCY MEDICINE
Payer: MEDICAID

## 2023-07-12 ENCOUNTER — HOSPITAL ENCOUNTER (EMERGENCY)
Facility: HOSPITAL | Age: 48
Discharge: HOME | End: 2023-07-12
Attending: EMERGENCY MEDICINE
Payer: MEDICAID

## 2023-07-12 VITALS
TEMPERATURE: 98 F | OXYGEN SATURATION: 100 % | HEIGHT: 66 IN | HEART RATE: 81 BPM | WEIGHT: 160 LBS | DIASTOLIC BLOOD PRESSURE: 84 MMHG | SYSTOLIC BLOOD PRESSURE: 146 MMHG | RESPIRATION RATE: 18 BRPM | BODY MASS INDEX: 25.71 KG/M2

## 2023-07-12 DIAGNOSIS — G89.29 CHRONIC MIDLINE BACK PAIN, UNSPECIFIED BACK LOCATION: Primary | ICD-10-CM

## 2023-07-12 DIAGNOSIS — M54.9 CHRONIC MIDLINE BACK PAIN, UNSPECIFIED BACK LOCATION: Primary | ICD-10-CM

## 2023-07-12 DIAGNOSIS — F32.A DEPRESSION, UNSPECIFIED DEPRESSION TYPE: ICD-10-CM

## 2023-07-12 DIAGNOSIS — K52.9 GASTROENTERITIS: Primary | ICD-10-CM

## 2023-07-12 LAB
ALBUMIN SERPL-MCNC: 4.2 G/DL (ref 3.3–5.5)
ALP SERPL-CCNC: 72 U/L (ref 42–141)
AMPHET+METHAMPHET UR QL: NEGATIVE
B-HCG UR QL: NEGATIVE
BARBITURATES UR QL SCN>200 NG/ML: NEGATIVE
BENZODIAZ UR QL SCN>200 NG/ML: NEGATIVE
BILIRUB SERPL-MCNC: 0.5 MG/DL (ref 0.2–1.6)
BILIRUBIN, POC UA: NEGATIVE
BLOOD, POC UA: ABNORMAL
BUN SERPL-MCNC: 7 MG/DL (ref 7–22)
BZE UR QL SCN: NEGATIVE
CALCIUM SERPL-MCNC: 10.2 MG/DL (ref 8–10.3)
CANNABINOIDS UR QL SCN: NEGATIVE
CHLORIDE SERPL-SCNC: 102 MMOL/L (ref 98–108)
CLARITY, POC UA: CLEAR
COLOR, POC UA: YELLOW
CREAT SERPL-MCNC: 0.7 MG/DL (ref 0.6–1.2)
CREAT UR-MCNC: 24 MG/DL (ref 15–325)
CTP QC/QA: YES
CTP QC/QA: YES
GLUCOSE SERPL-MCNC: 99 MG/DL (ref 73–118)
GLUCOSE, POC UA: NEGATIVE
INFLUENZA A ANTIGEN, POC: NEGATIVE
INFLUENZA B ANTIGEN, POC: NEGATIVE
KETONES, POC UA: ABNORMAL
LEUKOCYTE EST, POC UA: NEGATIVE
METHADONE UR QL SCN>300 NG/ML: NEGATIVE
NITRITE, POC UA: NEGATIVE
OPIATES UR QL SCN: NEGATIVE
PCP UR QL SCN>25 NG/ML: NEGATIVE
PH UR STRIP: 6.5 [PH]
POC ALT (SGPT): 23 U/L (ref 10–47)
POC AST (SGOT): 19 U/L (ref 11–38)
POC TCO2: 29 MMOL/L (ref 18–33)
POTASSIUM BLD-SCNC: 4.4 MMOL/L (ref 3.6–5.1)
PROTEIN, POC UA: NEGATIVE
PROTEIN, POC: 7.5 G/DL (ref 6.4–8.1)
SARS-COV-2 RDRP RESP QL NAA+PROBE: NEGATIVE
SODIUM BLD-SCNC: 144 MMOL/L (ref 128–145)
SPECIFIC GRAVITY, POC UA: 1.02
TOXICOLOGY INFORMATION: NORMAL
UROBILINOGEN, POC UA: 0.2 E.U./DL

## 2023-07-12 PROCEDURE — 85025 COMPLETE CBC W/AUTO DIFF WBC: CPT | Mod: ER

## 2023-07-12 PROCEDURE — 99285 EMERGENCY DEPT VISIT HI MDM: CPT | Mod: 25,ER

## 2023-07-12 PROCEDURE — 80307 DRUG TEST PRSMV CHEM ANLYZR: CPT | Performed by: EMERGENCY MEDICINE

## 2023-07-12 PROCEDURE — 99285 EMERGENCY DEPT VISIT HI MDM: CPT | Mod: 25,27

## 2023-07-12 PROCEDURE — 25000003 PHARM REV CODE 250: Mod: ER | Performed by: NURSE PRACTITIONER

## 2023-07-12 PROCEDURE — C9113 INJ PANTOPRAZOLE SODIUM, VIA: HCPCS | Mod: ER | Performed by: NURSE PRACTITIONER

## 2023-07-12 PROCEDURE — 96361 HYDRATE IV INFUSION ADD-ON: CPT | Mod: ER

## 2023-07-12 PROCEDURE — 96374 THER/PROPH/DIAG INJ IV PUSH: CPT | Mod: ER

## 2023-07-12 PROCEDURE — 81003 URINALYSIS AUTO W/O SCOPE: CPT | Mod: ER

## 2023-07-12 PROCEDURE — 63600175 PHARM REV CODE 636 W HCPCS: Mod: ER | Performed by: NURSE PRACTITIONER

## 2023-07-12 PROCEDURE — 80053 COMPREHEN METABOLIC PANEL: CPT | Mod: ER

## 2023-07-12 PROCEDURE — 87804 INFLUENZA ASSAY W/OPTIC: CPT | Mod: ER

## 2023-07-12 PROCEDURE — 81025 URINE PREGNANCY TEST: CPT | Mod: ER | Performed by: NURSE PRACTITIONER

## 2023-07-12 PROCEDURE — 87635 SARS-COV-2 COVID-19 AMP PRB: CPT | Mod: ER | Performed by: NURSE PRACTITIONER

## 2023-07-12 PROCEDURE — 96372 THER/PROPH/DIAG INJ SC/IM: CPT | Performed by: NURSE PRACTITIONER

## 2023-07-12 PROCEDURE — 96375 TX/PRO/DX INJ NEW DRUG ADDON: CPT | Mod: ER

## 2023-07-12 RX ORDER — DICYCLOMINE HYDROCHLORIDE 10 MG/ML
20 INJECTION INTRAMUSCULAR
Status: COMPLETED | OUTPATIENT
Start: 2023-07-12 | End: 2023-07-12

## 2023-07-12 RX ORDER — BUTALBITAL, ACETAMINOPHEN AND CAFFEINE 50; 325; 40 MG/1; MG/1; MG/1
1 TABLET ORAL
Status: CANCELLED | OUTPATIENT
Start: 2023-07-12 | End: 2023-07-12

## 2023-07-12 RX ORDER — DIPHENHYDRAMINE HYDROCHLORIDE 50 MG/ML
50 INJECTION INTRAMUSCULAR; INTRAVENOUS
Status: DISCONTINUED | OUTPATIENT
Start: 2023-07-12 | End: 2023-07-12 | Stop reason: HOSPADM

## 2023-07-12 RX ORDER — ONDANSETRON 8 MG/1
8 TABLET, ORALLY DISINTEGRATING ORAL EVERY 6 HOURS PRN
Qty: 10 TABLET | Refills: 0 | Status: SHIPPED | OUTPATIENT
Start: 2023-07-12 | End: 2023-07-15

## 2023-07-12 RX ORDER — METOCLOPRAMIDE HYDROCHLORIDE 5 MG/ML
10 INJECTION INTRAMUSCULAR; INTRAVENOUS
Status: COMPLETED | OUTPATIENT
Start: 2023-07-12 | End: 2023-07-12

## 2023-07-12 RX ORDER — PANTOPRAZOLE SODIUM 40 MG/10ML
40 INJECTION, POWDER, LYOPHILIZED, FOR SOLUTION INTRAVENOUS
Status: COMPLETED | OUTPATIENT
Start: 2023-07-12 | End: 2023-07-12

## 2023-07-12 RX ORDER — PANTOPRAZOLE SODIUM 20 MG/1
20 TABLET, DELAYED RELEASE ORAL DAILY
Qty: 30 TABLET | Refills: 0 | Status: SHIPPED | OUTPATIENT
Start: 2023-07-12 | End: 2024-07-11

## 2023-07-12 RX ORDER — SUCRALFATE 1 G/10ML
1 SUSPENSION ORAL 2 TIMES DAILY
Qty: 100 ML | Refills: 0 | Status: SHIPPED | OUTPATIENT
Start: 2023-07-12 | End: 2023-07-17

## 2023-07-12 RX ORDER — KETOROLAC TROMETHAMINE 30 MG/ML
15 INJECTION, SOLUTION INTRAMUSCULAR; INTRAVENOUS
Status: COMPLETED | OUTPATIENT
Start: 2023-07-12 | End: 2023-07-12

## 2023-07-12 RX ORDER — LORAZEPAM 0.5 MG/1
2 TABLET ORAL
Status: CANCELLED | OUTPATIENT
Start: 2023-07-12 | End: 2023-07-12

## 2023-07-12 RX ADMIN — DICYCLOMINE HYDROCHLORIDE 20 MG: 20 INJECTION, SOLUTION INTRAMUSCULAR at 05:07

## 2023-07-12 RX ADMIN — SODIUM CHLORIDE 1000 ML: 9 INJECTION, SOLUTION INTRAVENOUS at 05:07

## 2023-07-12 RX ADMIN — PANTOPRAZOLE SODIUM 40 MG: 40 INJECTION, POWDER, FOR SOLUTION INTRAVENOUS at 05:07

## 2023-07-12 RX ADMIN — KETOROLAC TROMETHAMINE 15 MG: 30 INJECTION, SOLUTION INTRAMUSCULAR; INTRAVENOUS at 05:07

## 2023-07-12 RX ADMIN — METOCLOPRAMIDE 10 MG: 5 INJECTION, SOLUTION INTRAMUSCULAR; INTRAVENOUS at 05:07

## 2023-07-12 NOTE — Clinical Note
"Milagro Steenmedardo Gomez was seen and treated in our emergency department on 7/12/2023.  She may return to work on 07/13/2023.       If you have any questions or concerns, please don't hesitate to call.      Funmilayo Corona, DO"

## 2023-07-12 NOTE — ED PROVIDER NOTES
"Encounter Date: 2023    SCRIBE #1 NOTE: I, Deya Alberto, am scribing for, and in the presence of,  Michael Hernandez DNP. I have scribed the following portions of the note - Other sections scribed: HPI, ROS, PE.     History     Chief Complaint   Patient presents with    Abdominal Pain     Pt complains abd pain with nausea and frontal headache for 3 days. Denies vomiting. Pt stated "my stomach is burning." Pt took tylenol with no relief.     48 year old female with a past medical history of Anxiety presenting to the Emergency room for further evaluation of generalized burning abdominal pain, nausea, and chest pain for 3 days. Patient reports an associated frontal, pressure headache that began this morning, frequency, urgency, intermittent chills, and 3 episodes of non-bloody "mushy" BM on today. Treatment of Gabapentin with no relief. Patient states she has been dealing with a lot of anxiety and stress recently. No other exacerbating or alleviating factors. Denies fever, dysuria, vomiting, vaginal bleeding, vaginal discharge, or other symptoms. No further complaints at present time.     The history is provided by the patient. No  was used.   Review of patient's allergies indicates:   Allergen Reactions    Codeine Shortness Of Breath and Hives    Iodine Shortness Of Breath    Penicillins Shortness Of Breath and Anaphylaxis    Shellfish containing products Shortness Of Breath and Swelling    Latex Rash    Latex, natural rubber Rash     Past Medical History:   Diagnosis Date    Anxiety disorder, unspecified     Depression     Fibroids      Past Surgical History:   Procedure Laterality Date    TONSILLECTOMY       No family history on file.  Social History     Tobacco Use    Smoking status: Former     Types: Cigarettes     Quit date: 2/10/2019     Years since quittin.4    Smokeless tobacco: Never   Substance Use Topics    Alcohol use: Not Currently    Drug use: No     Review of Systems "   Constitutional:  Positive for chills. Negative for fatigue and fever.   HENT:  Negative for congestion, ear discharge, ear pain, postnasal drip, rhinorrhea, sinus pressure, sneezing, sore throat and voice change.    Eyes:  Negative for discharge and itching.   Respiratory:  Negative for cough, shortness of breath and wheezing.    Cardiovascular:  Negative for chest pain, palpitations and leg swelling.   Gastrointestinal:  Positive for abdominal pain and nausea. Negative for blood in stool, constipation, diarrhea and vomiting.   Endocrine: Negative for polydipsia, polyphagia and polyuria.   Genitourinary:  Positive for frequency and urgency. Negative for dysuria, hematuria, vaginal bleeding, vaginal discharge and vaginal pain.   Musculoskeletal:  Negative for arthralgias and myalgias.   Skin:  Negative for rash and wound.   Neurological:  Positive for headaches. Negative for dizziness, seizures, syncope, weakness and numbness.   Hematological:  Negative for adenopathy. Does not bruise/bleed easily.   Psychiatric/Behavioral:  Negative for self-injury and suicidal ideas. The patient is not nervous/anxious.      Physical Exam     Initial Vitals [07/12/23 1523]   BP Pulse Resp Temp SpO2   (!) 143/87 98 18 98.3 °F (36.8 °C) 98 %      MAP       --         Physical Exam    Nursing note and vitals reviewed.  Constitutional: She appears well-developed and well-nourished.   HENT:   Head: Normocephalic and atraumatic.   Right Ear: External ear normal.   Left Ear: External ear normal.   Nose: Nose normal.   Eyes: Conjunctivae and EOM are normal. Pupils are equal, round, and reactive to light. Right eye exhibits no discharge. Left eye exhibits no discharge.   Neck:   Normal range of motion.  Abdominal: Abdomen is soft. Bowel sounds are normal. She exhibits no distension. There is abdominal tenderness in the left upper quadrant.   Musculoskeletal:         General: Normal range of motion.      Cervical back: Normal range of  motion.     Neurological: She is alert and oriented to person, place, and time.   Skin: Skin is dry. Capillary refill takes less than 2 seconds.       ED Course   Procedures  Labs Reviewed   POCT URINALYSIS W/O SCOPE - Abnormal; Notable for the following components:       Result Value    Ketones, UA Trace (*)     Blood, UA 1+ (*)     All other components within normal limits   POCT CBC   POCT URINE PREGNANCY   POCT URINALYSIS W/O SCOPE   SARS-COV-2 RDRP GENE    Narrative:     This test utilizes isothermal nucleic acid amplification technology to detect the SARS-CoV-2 RdRp nucleic acid segment. The analytical sensitivity (limit of detection) is 500 copies/swab.     A POSITIVE result is indicative of the presence of SARS-CoV-2 RNA; clinical correlation with patient history and other diagnostic information is necessary to determine patient infection status.    A NEGATIVE result means that SARS-CoV-2 nucleic acids are not present above the limit of detection. A NEGATIVE result should be treated as presumptive. It does not rule out the possibility of COVID-19 and should not be the sole basis for treatment decisions. If COVID-19 is strongly suspected based on clinical and exposure history, re-testing using an alternate molecular assay should be considered.     This test is only for use under the Food and Drug Administration s Emergency Use Authorization (EUA).     Commercial kits are provided by [employer]. Performance characteristics of the EUA have been independently verified by [facility] Department of Pathology and Laboratory Medicine.   _________________________________________________________________   The authorized Fact Sheet for Healthcare Providers and the authorized Fact Sheet for Patients of the ID NOW COVID-19 are available on the FDA website:    https://www.fda.gov/media/560406/download      https://www.fda.gov/media/003841/download      POCT INFLUENZA A/B MOLECULAR   POCT CMP   POCT CMP    POCT RAPID INFLUENZA A/B          Imaging Results              CT Abdomen Pelvis  Without Contrast (Final result)  Result time 07/12/23 17:25:49   Procedure changed from CT Abdomen Pelvis With Contrast     Final result by Jareth Ott MD (07/12/23 17:25:49)                   Impression:      No acute intra-abdominal abnormalities identified.      Electronically signed by: Jareth Ott MD  Date:    07/12/2023  Time:    17:25               Narrative:    EXAMINATION:  CT ABDOMEN PELVIS WITHOUT CONTRAST    CLINICAL HISTORY:  Abdominal pain, acute, nonlocalized;    TECHNIQUE:  Low dose axial images, sagittal and coronal reformations were obtained from the lung bases to the pubic symphysis.  Oral contrast was not administered.    COMPARISON:  Recent CT abdomen and pelvis 06/16/2023.    FINDINGS:  The visualized portion of the heart is unremarkable.  The lung bases are clear.    No significant hepatic abnormalities are identified.  There is no intra-or extrahepatic biliary ductal dilatation.  The gallbladder is unremarkable.  The stomach, pancreas, spleen, and adrenal glands are unremarkable.    Kidneys show no evidence of stones or hydronephrosis.  Ureters are difficult to track distally with bilateral presumed pelvic phleboliths seen.  Urinary bladder is nondistended.  Uterus is unremarkable.  No significant adnexal abnormalities are seen.    Appendix is visualized and is unremarkable.  The visualized loops of small and large bowel show no evidence of obstruction or inflammation.  No free air or free fluid.    Aorta tapers normally.    No acute osseous abnormality identified. Subcutaneous soft tissues show no significant abnormalities.                                       Medications   sodium chloride 0.9% bolus 1,000 mL 1,000 mL (1,000 mLs Intravenous New Bag 7/12/23 2716)   diphenhydrAMINE injection 50 mg (50 mg Intravenous Not Given 7/12/23 1751)   dicyclomine injection 20 mg (20 mg Intramuscular Given  7/12/23 1726)   metoclopramide HCl injection 10 mg (10 mg Intravenous Given 7/12/23 1744)   ketorolac injection 15 mg (15 mg Intravenous Given 7/12/23 1743)   pantoprazole injection 40 mg (40 mg Intravenous Given 7/12/23 1736)     Medical Decision Making:   History:   Old Medical Records: I decided to obtain old medical records.  Initial Assessment:   48 y.o. female presents to the ER for multiple complaints, such as, nausea, abdominal pain, headache, chills, frequency, and urgency. On exam, patient had tenderness of the LUQ. US imaging ordered. CBC, CMP, UPT ordered.   Differential Diagnosis:   Includes but is not limited to: Generalized abdominal pain, UTI, Viral illnesses.     Clinical Tests:   Lab Tests: Ordered and Reviewed  Radiological Study: Ordered and Reviewed            Scribe Attestation:   Scribe #1: I performed the above scribed service and the documentation accurately describes the services I performed. I attest to the accuracy of the note.      ED Course as of 07/12/23 1810 Wed Jul 12, 2023   1546 BP(!): 143/87 [VC]   1546 Temp: 98.3 °F (36.8 °C) [VC]   1546 Temp Source: Oral [VC]   1546 Resp: 18 [VC]   1546 Pulse: 98 [VC]   1546 SpO2: 98 % [VC]   1642 POCT CMP  Normal cmp. [VC]   1700 POCT CBC  Mildly elevated wbc, normal h/h and plt count. [VC]   1705 POCT URINALYSIS W/O SCOPE(!)  Neg for uti. [VC]   1731 Preg Test, Ur: Negative [VC]   1731 CT Abdomen Pelvis  Without Contrast  No acute intra-abdominal abnormalities identified. [VC]   1749 Sbar given to mitul cruz do. [VC]      ED Course User Index  [VC] Michael Hernandez DNP          I performed a substantive portion of the visit. I reviewed the NP/PA's notes, vital signs, assessments, and/or procedures performed and agree with the NP/PA's assessment and plan of care.   Physician Attestation Statement: I have reviewed this case with my non-physician provider.   Physician Attestation Statement: I have provided a face to face evaluation of this  patient at the request of my non-physician provider.  I agree with the HPI, review of systems, and physical exam, as documented.  The patient's condition warranted physician involvement.  My assessment is as follows:    48 y.o. female who presents to the ED for chief complaint of abdominal discomfort, abdominal burning, nausea, and chest pain for 3 days.  External records reviewed and PMHx obtained from patient indicate the following chronic medical conditions impacting care:   Anxiety disorder, unspecified   Depression        Physical exam:  Awake alert oriented ×4 speaking clearly in full sentences.    Regular rate and rhythm no murmur gallop or rub.   Clear to auscultation bilateral without wheeze crackles or Rales   Abdomen soft, epigastric tenderness, nondistended. Bowel sounds ×4.   Pulses palpable ×4 no clubbing cyanosis or edema.   Skin no rash, no wound.   Medical Decision Making:  Treatment included physical exam and   Medications   sodium chloride 0.9% bolus 1,000 mL 1,000 mL (1,000 mLs Intravenous New Bag 7/12/23 1746)   diphenhydrAMINE injection 50 mg (50 mg Intravenous Not Given 7/12/23 1751)   dicyclomine injection 20 mg (20 mg Intramuscular Given 7/12/23 1726)   metoclopramide HCl injection 10 mg (10 mg Intravenous Given 7/12/23 1744)   ketorolac injection 15 mg (15 mg Intravenous Given 7/12/23 1743)   pantoprazole injection 40 mg (40 mg Intravenous Given 7/12/23 1736)   .     External Data/Documents Reviewed:   Labs: ordered and reviewed. Decision-making details documented in ED Course.  Radiology: ordered as indicated and reviewed. Decision-making details documented in ED Course.   ECG/medicine tests: ordered and independent interpretation performed by Dr. Funmilayo Corona DO. Decision-making details documented in ED Course.     Patient presents with chest pain for greater than 24 hours.  Troponin is negative.  No STEMI on EKG and no acute issues on chest x-ray. Chest pain unlikely to be cardiac in  "origin.  I recommend follow up with Cardiology in 1 day for further evaluation of chest pain. Discussed need to return to the ED if chest pain worsens or does not resolve.    Patient reports feeling better after treatment in the emergency room.  I reviewed documentation and agree with the NP/PA documentation, treatment plan, and medical decision making.              I, Dr. Funmilayo Corona, personally performed the services described in this documentation. This document was produced by a scribe under my direction and in my presence. All medical record entries made by the scribe were at my direction and in my presence.  I have reviewed the chart and agree that the record reflects my personal performance and is accurate and complete. Funmilayo Corona, DO.     07/12/2023 6:09 PM    Clinical Impression:   Final diagnoses:  [K52.9] Gastroenteritis (Primary)        ED Disposition Condition    Discharge Stable        Vital signs at the time of disposition were:  BP (!) 146/84   Pulse 81   Temp 98.3 °F (36.8 °C) (Oral)   Resp 18   Ht 5' 6" (1.676 m)   Wt 72.6 kg (160 lb)   LMP  (LMP Unknown)   SpO2 100%   BMI 25.82 kg/m²       See AVS for additional recommendations. Medications listed herein were prescribed after reviewing the patient's allergies, medication list, history, most recent laboratories as available.  Referrals below were provided after reviewing the patient's previous medical providers. She understands she  should return for any worsening or changes in condition.  Prior to discharge the patient was asked if she  had any additional concerns or complaints and she declined. The patient was given an opportunity to ask questions and all were answered to her satisfaction.    ED Prescriptions       Medication Sig Dispense Start Date End Date Auth. Provider    ondansetron (ZOFRAN-ODT) 8 MG TbDL Take 1 tablet (8 mg total) by mouth every 6 (six) hours as needed (As needed for nausea vomiting). 10 tablet " 7/12/2023 7/15/2023 Funmilayo Corona DO    sucralfate (CARAFATE) 100 mg/mL suspension Take 10 mLs (1 g total) by mouth 2 (two) times daily. for 5 days 100 mL 7/12/2023 7/17/2023 Funmilayo Corona DO    pantoprazole (PROTONIX) 20 MG tablet Take 1 tablet (20 mg total) by mouth once daily. 30 tablet 7/12/2023 7/11/2024 Funmilayo Corona DO          Follow-up Information       Follow up With Specialties Details Why Contact Info    Juan Saldivar MD Family Medicine Schedule an appointment as soon as possible for a visit in 1 day  [address] [facility]  [place] [address]  [phone]      [facility] - Emergency Dept Emergency Medicine Go to  Please go to [facility] emergency department if symptoms worsen [address]  [facility] [id number] [phone]             Funmilayo Corona DO  07/12/23 1814

## 2023-07-13 VITALS
TEMPERATURE: 98 F | RESPIRATION RATE: 16 BRPM | HEART RATE: 68 BPM | SYSTOLIC BLOOD PRESSURE: 149 MMHG | WEIGHT: 160 LBS | DIASTOLIC BLOOD PRESSURE: 78 MMHG | OXYGEN SATURATION: 98 % | BODY MASS INDEX: 25.82 KG/M2

## 2023-07-13 PROCEDURE — 99215 OFFICE O/P EST HI 40 MIN: CPT | Mod: 95,AF,HB, | Performed by: PSYCHIATRY & NEUROLOGY

## 2023-07-13 PROCEDURE — 99215 PR OFFICE/OUTPT VISIT, EST, LEVL V, 40-54 MIN: ICD-10-PCS | Mod: 95,AF,HB, | Performed by: PSYCHIATRY & NEUROLOGY

## 2023-07-13 PROCEDURE — 25000003 PHARM REV CODE 250: Performed by: EMERGENCY MEDICINE

## 2023-07-13 RX ORDER — CYCLOBENZAPRINE HCL 10 MG
10 TABLET ORAL
Status: COMPLETED | OUTPATIENT
Start: 2023-07-13 | End: 2023-07-13

## 2023-07-13 RX ORDER — CYCLOBENZAPRINE HCL 10 MG
10 TABLET ORAL 3 TIMES DAILY PRN
Qty: 15 TABLET | Refills: 0 | Status: SHIPPED | OUTPATIENT
Start: 2023-07-13 | End: 2023-07-18

## 2023-07-13 RX ORDER — ACETAMINOPHEN 325 MG/1
650 TABLET ORAL
Status: COMPLETED | OUTPATIENT
Start: 2023-07-13 | End: 2023-07-13

## 2023-07-13 RX ADMIN — ACETAMINOPHEN 650 MG: 325 TABLET ORAL at 01:07

## 2023-07-13 RX ADMIN — CYCLOBENZAPRINE 10 MG: 10 TABLET, FILM COATED ORAL at 02:07

## 2023-07-13 NOTE — ED PROVIDER NOTES
"Encounter Date: 2023       History     Chief Complaint   Patient presents with    Suicidal     48 y.o. female with depression, anxiety and fibroids presents emergency department expressing suicidal ideations.  Patient states "I just want to OD on pills.  I have nothing to live for.  Everything is piling up on me.  I want to kill myself. "    Patient seen in ED earlier today with multiple complaints of abdominal pain, HA, nausea, chills and urinary symptoms. ED work up including basic labs, UA, CTAP. Patient given Toradol, Reglan, Bentyl, pepcid, benadryl, and NS 1L bolus.  Patient prescribed Carafate, Protonix and Zofran.  She reports persistent headache, nausea and abdominal pain and she also mentions having chronic low back pain.  She denies taking any meds since leaving ED earlier today.     The history is provided by the patient.   Review of patient's allergies indicates:   Allergen Reactions    Codeine Shortness Of Breath and Hives    Iodine Shortness Of Breath    Penicillins Shortness Of Breath and Anaphylaxis    Shellfish containing products Shortness Of Breath and Swelling    Latex Rash    Latex, natural rubber Rash     Past Medical History:   Diagnosis Date    Anxiety disorder, unspecified     Depression     Fibroids      Past Surgical History:   Procedure Laterality Date    TONSILLECTOMY       No family history on file.  Social History     Tobacco Use    Smoking status: Former     Types: Cigarettes     Quit date: 2/10/2019     Years since quittin.4    Smokeless tobacco: Never   Substance Use Topics    Alcohol use: Not Currently    Drug use: No     Review of Systems   Constitutional:  Negative for fever.   Gastrointestinal:  Positive for abdominal pain and nausea.   Genitourinary:  Positive for frequency.   Musculoskeletal:  Positive for back pain (chronic low back pain). Negative for gait problem.   Neurological:  Positive for headaches.   Psychiatric/Behavioral:  Positive for dysphoric mood and " suicidal ideas.      Physical Exam     Initial Vitals [07/12/23 1957]   BP Pulse Resp Temp SpO2   (!) 141/90 80 19 97.8 °F (36.6 °C) 97 %      MAP       --         Physical Exam    Nursing note and vitals reviewed.  Constitutional: She appears well-developed and well-nourished. She is not diaphoretic. No distress.   HENT:   Head: Normocephalic and atraumatic.   Eyes: Conjunctivae are normal.   Neck: Neck supple.   Normal range of motion.  Cardiovascular:  Normal rate and intact distal pulses.           Pulmonary/Chest: No accessory muscle usage. No tachypnea. No respiratory distress.   Abdominal: She exhibits no distension. There is no abdominal tenderness.   Musculoskeletal:         General: No tenderness. Normal range of motion.      Cervical back: Normal range of motion and neck supple.     Neurological: She is alert and oriented to person, place, and time. She has normal strength. Gait normal. GCS eye subscore is 4. GCS verbal subscore is 5. GCS motor subscore is 6.   Skin: Skin is warm. Capillary refill takes less than 2 seconds.   Psychiatric: Her speech is delayed and tangential. She is slowed. She is not actively hallucinating. She exhibits a depressed mood. She expresses suicidal ideation. She expresses no homicidal ideation. She expresses suicidal plans. She expresses no homicidal plans.       ED Course   Procedures  Labs Reviewed   DRUG SCREEN PANEL, URINE EMERGENCY    Narrative:     Specimen Source->Urine          Imaging Results    None          Medications   acetaminophen tablet 650 mg (650 mg Oral Given 7/13/23 0115)   cyclobenzaprine tablet 10 mg (10 mg Oral Given 7/13/23 0250)     Medical Decision Making:   Initial Assessment:   48 y.o. female with acute suicidal ideation.  She also reports chronic low back pain and persistent HA, abdominal pain, nausea since since earlier ED visit.  Will transfer to [facility] for medical clearance for psychiatric admission workup  Differential Diagnosis:    Substance induced mood disorder, excited delirium, acute psychosis, hypoglycemia, hyperglycemia, hypothyroidism, toxicity, encephalopathy, renal failure, hypernatremia, hyponatremia, hypercalcemia, hypoxia, hypercapnia, sepsis, others    Clinical Tests:   Lab Tests: Ordered and Reviewed  ED Management:  Patient accepted for ED to ED transfer to [facility].  Accepted by Dr. Brown.  PEC completed.  notified.            ED Course as of 07/14/23 0443   Wed Jul 12, 2023 1952 Patient accepted to [facility] ED for medical clearance for psychiatric admission [DL]   u Jul 13, 2023   0123 Still awaiting psychiatric consult. [JM]   0233 Dr. Ellison, psychiatry.   Recommends flexeril. States that she was just seen inpatient psychiatry. She has follow up for psychiatry established. Recommends resending PEC.  [JM]      ED Course User Index  [DL] Ry Sampson MD  [JM] Micheal Santiago MD       Medically cleared for psychiatry placement: 7/12/2023 10:46 PM     Labs Reviewed    Admission on 07/12/2023, Discharged on 07/13/2023   Component Date Value Ref Range Status    Benzodiazepines 07/12/2023 Negative  Negative Final    Methadone metabolites 07/12/2023 Negative  Negative Final    Cocaine (Metab.) 07/12/2023 Negative  Negative Final    Opiate Scrn, Ur 07/12/2023 Negative  Negative Final    Barbiturate Screen, Ur 07/12/2023 Negative  Negative Final    Amphetamine Screen, Ur 07/12/2023 Negative  Negative Final    THC 07/12/2023 Negative  Negative Final    Phencyclidine 07/12/2023 Negative  Negative Final    Creatinine, Urine 07/12/2023 24.0  15.0 - 325.0 mg/dL Final    Toxicology Information 07/12/2023 SEE COMMENT   Final    Comment: This screen includes the following classes of drugs at the listed   cut-off:    Benzodiazepines 200 ng/ml  Methadone 300 ng/ml  Cocaine metabolite 300 ng/ml  Opiates 300 ng/ml  Barbiturates 200 ng/ml  Amphetamines 1000 ng/ml  Marijuana metabs (THC) 50 ng/ml  Phencyclidine (PCP)  25 ng/ml    This is a screening test. If results do not correlate with clinical   presentation, then a confirmatory send out test is advised.     This report is intended for use in clinical monitoring and management   of   patients. It is not intended for use in employment related drug   testing.          Imaging Reviewed    Imaging Results    None         Medications given in ED    Medications   acetaminophen tablet 650 mg (650 mg Oral Given 7/13/23 0115)   cyclobenzaprine tablet 10 mg (10 mg Oral Given 7/13/23 0250)       Note was created using voice recognition software. Note may have occasional typographical errors that may not have been identified and edited despite good shen initial review prior to signing.      Clinical Impression:   Final diagnoses:  [M54.9, G89.29] Chronic midline back pain, unspecified back location (Primary)  [F32.A] Depression, unspecified depression type        ED Disposition Condition    Discharge Stable          ED Prescriptions       Medication Sig Dispense Start Date End Date Auth. Provider    cyclobenzaprine (FLEXERIL) 10 MG tablet Take 1 tablet (10 mg total) by mouth 3 (three) times daily as needed for Muscle spasms. 15 tablet 7/13/2023 7/18/2023 Micheal Santiago MD          Follow-up Information       Follow up With Specialties Details Why Contact Info    Juan Saldivar MD Family Medicine Schedule an appointment as soon as possible for a visit in 3 days Primary care [address] [facility] [address]  [phone]      [facility] [place] - Emergency Dept Emergency Medicine  If symptoms worsen [address] [place] [address] [phone]             Ry Sampson MD  07/12/23 8683       Ry Sampson MD  07/14/23 5836

## 2023-07-13 NOTE — DISCHARGE INSTRUCTIONS
[facility]         [facility]    (aka [facility], aka [facility])    Serves [facility], and [facility] residents.    Serves uninsured patients & those with Medicaid.    Main location: [address], [place] [address] [phone]    Walk-in's available during regular business hours.    24/7 Crisis Line: [phone]         [facility]    (aka [facility])    Serves [facility].    Serves uninsured patients, those with Medicaid and some private plans.    Walk-in's available during regular business hours.    Primary care services available as well.    [facility]: [address] [place] [address];    [phone]    [place]: [address] [place] [address];    [phone]    24/7 Crisis Line: [phone]         [facility]    Serves uninsured patients & those with Medicaid, call for more info.    Primary care, pediatrics, HIV treatment, and dentistry services available as well.    Three locations.    [phone]         [employer] [facility]?Corporate Office    Serves patients with Medicaid, Medicare, and private insurance    [address]    [place],?[place] [address] [phone]         [facility]    Serves uninsured on a sliding scale, as well as Medicaid, Medicare, and private plans.    Eight locations around the [facility] area.    [phone]         [facility]    Serves uninsured patients & those with Medicaid, private insurances.    Primary care available as well.    [phone]    [facility] [place] [address]         [facility]    Serves veterans who were honorably discharged.    [address] [place] [address]    [phone]    24/7 Veterans Crisis Line: [phone]  (Press 1)         If you have private insurance and need to find a specialist, please contact your insurance network to request a list of providers covered by your benefits.              MENTAL HEALTH/ADDICTIVE DISORDERS    REFERRAL RECOMMENDATIONS         AA ([phone]), NA ([phone])     National Suicide Prevention Lifeline- Call 1-958.552.2800 Available 24 hours everyday    [facility] [phone]; Crisis Line [phone] - Call for options A-F:    Intensive Outpatient Treatment/ Day programs     [facility], please contact [phone] or [phone] to speak with an admissions counselor.    [facility], please contact     [facility] (Methadone Maintenance)     [address] [place] [address], [phone]    [address], [place] [address] [phone]    [facility] [address] [place] [address], [phone]    [facility] [phone]    [place] [facility] please contact [phone]    [facility], [address] [facility], 4th [place] [address] Phone: [phone]     [facility]    [place] Office: [address] [address], [phone]    [place] Office: [facility], [place] [address], [phone]    [place] Office: [address] [place] [address] [phone]         Outpatient Substance Abuse Treatment     [facility] (Methadone Maintenance)     [address] [place] [address], [phone]    [address], [place] [address] [phone]    [facility], [facility] [address], [phone]    [place] Office: [address] [address], [phone]    [place] Office: [facility], [address], [place] [address], [phone]    [place] Office: [address] [place] [address], STACI Lomeli [address] [phone]    [facility], [facility]  [place], [place] [address] [phone]     [facility], [address] [facility], [address], [phone]    [facility], [address] [place], [place] [phone]         Residential Substance Abuse Treatment                              ·         [facility] [address] [facility], [phone] or [phone]                             ·         [facility], [address] [facility], [phone]                             ·         [facility] (men only) [address] [place] [address], [phone]                             ·         [facility] (women only) [address] [place] [address] [phone]                             ·         [facility], [address] [place] [address] [phone]                             ·         [facility] (women only), intakes at [address] [facility], [phone]                             ·         [facility] (7-day program, $100, [address] [place], [phone], [phone], [phone])                             ·         [facility] (Men only, [phone]), [address], [place] (Vets*/Non-Vets)                             ·         [facility] (Men only, $400/month program fee) [address] [facility], [phone]                             ·         [facility] (Women over age 39 only), [address] [facility], [phone]                             ·              Out of Area:                               ·         [place], [address] [facility], [place], [place] ([phone])                             ·         [facility] (men only), [address] [facility] [place] [address], [phone]                             ·         [facility], [address], [place] ([phone])                             ·         [place], [address]   [place], [place] ([phone])                             ·         [facility], [address] [facility], [phone]                             ·         [facility] (Women only, has to have mental illness, can be homeless or substance abuser), [phone]

## 2023-07-13 NOTE — ED PROVIDER NOTES
Encounter Date: 2023       History     Chief Complaint   Patient presents with    Suicidal     48-year-old female [facility].  Patient had presented earlier in the day for multiple varying complaints including chronic back pain and abdominal pain.  Patient reports continued symptoms of abdominal back pain.  Patient had stated earlier that she wanted to kill herself.  When asked at this time patient states that she made these comments due to her chronic back pain.  Patient reports back pain radiating from the lower back into bilateral hips.  Reports lower pelvic pain that has been present for months.  Patient is being seen by gynecology for the pelvic pain.    Review of patient's allergies indicates:   Allergen Reactions    Codeine Shortness Of Breath and Hives    Iodine Shortness Of Breath    Penicillins Shortness Of Breath and Anaphylaxis    Shellfish containing products Shortness Of Breath and Swelling    Latex Rash    Latex, natural rubber Rash     Past Medical History:   Diagnosis Date    Anxiety disorder, unspecified     Depression     Fibroids      Past Surgical History:   Procedure Laterality Date    TONSILLECTOMY       No family history on file.  Social History     Tobacco Use    Smoking status: Former     Types: Cigarettes     Quit date: 2/10/2019     Years since quittin.4    Smokeless tobacco: Never   Substance Use Topics    Alcohol use: Not Currently    Drug use: No     Review of Systems   Constitutional:  Negative for chills and fever.   HENT:  Negative for sore throat.    Respiratory:  Negative for cough and shortness of breath.    Cardiovascular:  Negative for chest pain.   Gastrointestinal:  Positive for abdominal pain. Negative for blood in stool, diarrhea, nausea and vomiting.   Genitourinary:  Negative for dysuria, flank pain and frequency.   Musculoskeletal:  Positive for back pain.   Skin:  Negative for rash.    Psychiatric/Behavioral:  Negative for confusion.      Physical Exam     Initial Vitals [07/12/23 1957]   BP Pulse Resp Temp SpO2   (!) 141/90 80 19 97.8 °F (36.6 °C) 97 %      MAP       --         Physical Exam    Nursing note and vitals reviewed.  Constitutional: She appears well-developed and well-nourished. She does not appear ill. No distress.   HENT:   Head: Normocephalic and atraumatic.   Mouth/Throat: Oropharynx is clear and moist.   Eyes: Conjunctivae and EOM are normal.   Neck:   Normal range of motion.  Cardiovascular:  Regular rhythm and normal heart sounds.   Tachycardia present.         No murmur heard.  Pulmonary/Chest: Breath sounds normal. No respiratory distress. She has no wheezes.   Abdominal: Abdomen is soft and flat. There is no abdominal tenderness.   No right CVA tenderness.  No left CVA tenderness. There is no guarding.   Musculoskeletal:         General: No edema.      Cervical back: Normal range of motion.      Comments: No midline back tenderness.  Bilateral paraspinal tenderness reported.  Full range of motion bilateral lower extremities.  No lower leg edema.     Neurological: She is alert. GCS score is 15.   Skin: Skin is warm.   Psychiatric: She has a normal mood and affect.       ED Course   Procedures  Labs Reviewed   DRUG SCREEN PANEL, URINE EMERGENCY    Narrative:     Specimen Source->Urine          Imaging Results    None          Medications   cyclobenzaprine tablet 10 mg (has no administration in time range)   acetaminophen tablet 650 mg (650 mg Oral Given 7/13/23 0115)     Medical Decision Making:   Initial Assessment:     48-year-old female presenting as a transfer from freestanding ER for possible suicidal ideation.  Patient has been placed on a pec prior to arrival.  During our discussion the patient denied any suicidal thoughts stating that she only stated this due to the pain.  Patient reported back pain and abdominal pain.  Patient reports these are chronic in nature.   Patient is established with a primary care provider for stated pain.  Patient is seeing Gynecology for the pelvic pain.  Patient adamantly denies any suicidal thoughts.  Psychiatry consulted who agrees patient should not be on a pec at this time.  Patient has been evaluated inpatient psychiatry recently.  Patient has established outpatient follow up for psychiatry.  Patient be started on muscle relaxer for chronic back pain.  Differential Diagnosis:   Chronic back pain           ED Course as of 07/13/23 0239   Wed Jul 12, 2023 1952 Patient accepted to [facility] for medical clearance for psychiatric admission [DL]   Thu Jul 13, 2023   0123 Still awaiting psychiatric consult. [JM]   0233 Dr. Ellison, psychiatry.   Recommends flexeril. States that she was just seen inpatient psychiatry. She has follow up for psychiatry established. Recommends resending [facility].  [JM]      ED Course User Index  [DL] Ry Sampson MD  [JM] Micheal Santiago MD       Medically cleared for psychiatry placement: 7/12/2023 10:46 PM         Clinical Impression:   Final diagnoses:  [M54.9, G89.29] Chronic midline back pain, unspecified back location (Primary)  [F32.A] Depression, unspecified depression type        ED Disposition Condition    Discharge Stable          ED Prescriptions       Medication Sig Dispense Start Date End Date Auth. Provider    cyclobenzaprine (FLEXERIL) 10 MG tablet Take 1 tablet (10 mg total) by mouth 3 (three) times daily as needed for Muscle spasms. 15 tablet 7/13/2023 7/18/2023 Micheal Santiago MD          Follow-up Information       Follow up With Specialties Details Why Contact Info    Juan Saldivar MD Family Medicine Schedule an appointment as soon as possible for a visit in 3 days Primary care [facility] [facility] [address]  [phone]      [facility] Emergency Dept Emergency Medicine  If symptoms worsen [address]  [facility] [address] [phone]             Micheal Santiago,  MD  07/13/23 0239

## 2023-07-13 NOTE — ED NOTES
Pt arrived to ER via EMS. Pt alert and calm at this time. Pt has c/o generalized body aches at this time.

## 2023-07-13 NOTE — CONSULTS
"[facility]  Psychiatry  Telepsychiatry Consult Note    Please see previous notes:    Patient agreeable to consultation via telepsychiatry.    Tele-Consultation from Psychiatry started: 7/13/2023 at 1:26 AM    The chief complaint leading to psychiatric consultation is: SI  This consultation was requested by Dr. Santiago, the Emergency Department attending physician.  The location of the consulting psychiatrist is  [place] .  The patient location is  [facility] EMERGENCY DEPARTMENT   The patient arrived at the ED at: 1933    Also present with the patient at the time of the consultation:      Patient Identification:   Milagro Gomez is a 48 y.o. female.    Patient information was obtained from patient, past medical records, and primary team.  Patient presented voluntarily to the Emergency Department     Inpatient consult to Telemedicine - Psychiatry  Consult performed by: Angelina Ellison MD  Consult ordered by: Micheal Santiago MD      Teleconsult Time Documentation  Subjective:     History of Present Illness:  Milagro Gomez is a 48 year old female  with history of depression who presents to ED with abdominal pain expressing statements concerning for SI. Psychiatry was consulted to evaluate patient and provide recommendations.     Chart was reviewed. Patient was seen, assessed and discussed with ED attending.     Patient states "My chronic pain. Now I'm having stomach issues."    Patient states that she has a long history of chronic back pain that has been worsening. Currently seeing a neurosurgeon, who is planning on surgery in the future.  Now she expresses emotional distress over stomach issues that has been occurring for the last 2-3 weeks, with pain and nausea. Patient expresses fear about her health as well as poor coping skills for these changes.  Was hospitalized at [facility] for SI related to health conditions, and states she was given pain medication while in the hospital that she " "found helpful (tramadol). Was discharged on cybalta and gabapentin, feels this has not had adequate control of her pain. Has a primary care follow up on Friday. Currently in process of applying for disability. Cites support from her fiance of five years, but feels he does not truly understand what she is experiencing with pain.  Had assessment at [facility] for outpatient behavioral health, but is unable to see psychiatrist until September.  She denies suicidal intent, only a desire to no longer be in pain. Denies history of SA.    Denies hallucinations, HI, decreased need for sleep.     Psychiatric History:   Previous Psychiatric Hospitalizations: Yes most recent discharge 6/25/2023  Previous Medication Trials: Yes - prozac, buspar, gabapentin, seroquel, abilify, zoloft  Previous Suicide Attempts: no   History of Violence: no  History of Depression: yes  History of Veronica: no  History of Auditory/Visual Hallucination no  History of Delusions: no  Outpatient psychiatrist (current & past): No    Substance Abuse History:  Tobacco:No  Alcohol: No  Illicit Substances:No  Detox/Rehab: No    Legal History: Past charges/incarcerations: No     Family Psychiatric History: brother and father with bpad      Social History:    Employment Status/Finances:Unemployed   Relationship Status/Sexual Orientation: Partnered: Relationship intact  Children:  6 (estranged from them)  Housing Status: Home    history:  NO  Access to gun: NO      Psychiatric Mental Status Exam:  Arousal: alert  Sensorium/Orientation: oriented to grossly intact  Behavior/Cooperation: cooperative, restless and fidgety    Speech: normal tone, normal rate, normal pitch, normal volume  Language: grossly intact  Mood: " scared "   Affect: constricted  Thought Process: perseverative  Thought Content:   Auditory hallucinations: NO  Visual hallucinations: NO  Paranoia: NO  Delusions:  NO  Suicidal ideation: YES: contingent     Homicidal ideation: " NO  Attention/Concentration:  fair  Memory:    Recent:  Intact   Remote: Intact     Fund of Knowledge: Vocabulary appropriate      Insight: has awareness of illness  Judgment: limited      Past Medical History:   Past Medical History:   Diagnosis Date    Anxiety disorder, unspecified     Depression     Fibroids       Laboratory Data:   Labs Reviewed   DRUG SCREEN PANEL, URINE EMERGENCY    Narrative:     Specimen Source->Urine       Neurological History:  Seizures: No  Head trauma: No    Allergies: reviewed  Review of patient's allergies indicates:   Allergen Reactions    Codeine Shortness Of Breath and Hives    Iodine Shortness Of Breath    Penicillins Shortness Of Breath and Anaphylaxis    Shellfish containing products Shortness Of Breath and Swelling    Latex Rash    Latex, natural rubber Rash       Medications in ER:   Medications   acetaminophen tablet 650 mg (650 mg Oral Given 7/13/23 0115)       Medications at home: reviewed    No new subjective & objective note has been filed under this hospital service since the last note was generated.      Assessment - Diagnosis - Goals:     Diagnosis/Impression:   Adjustment Disorder  MDD, recurrent     Patient with chronic depression, difficulty adjusting to current health issues and chronic pain. Reviewed with patient documentation from providers about treatment plan for chronic issues, including PT referral, pain management referral and upcoming surgery. Patient preservative on pain status and need for relief this evening, requesting muscle relaxer.     Cyclobenzaprine would be a good temporary option, as its TCA structure may also provide benefit for anxiety and mood issues related to her illness. Would avoid giving controlled substances in emergency room setting.     Patient has follow up with primary care, as well as ongoing management of neurosurgery.     Regarding SI, SI appears contingent on getting pain issues addressed. This is not likely to be ameliorated in  inpatient psychiatric setting. She was recently discharged from inpatient psych with solid outpatient follow up plan. Patient has several protective factors, including future orientation, has demonstrated she seeks help in the ED when she is in distress or desperate, social supports, stable housing and no prior suicide attempt.      Rec:   Cyclobenzaprine 10 mg tid prn, #6   Continue other psych meds unchanged  Follow up with primary care Friday  Has established with [facility] for behavioral health    Time with patient: 60 min      More than 50% of the time was spent counseling/coordinating care    Consulting clinician was informed of the encounter and consult note.    Consultation ended: 7/13/2023 at 2:52 AM      Angelina Ellison MD   Psychiatry  [facility]

## 2023-07-21 ENCOUNTER — HOSPITAL ENCOUNTER (EMERGENCY)
Facility: HOSPITAL | Age: 48
Discharge: HOME | End: 2023-07-21
Attending: INTERNAL MEDICINE
Payer: MEDICAID

## 2023-07-21 VITALS
TEMPERATURE: 98 F | BODY MASS INDEX: 24.91 KG/M2 | SYSTOLIC BLOOD PRESSURE: 140 MMHG | DIASTOLIC BLOOD PRESSURE: 86 MMHG | RESPIRATION RATE: 18 BRPM | HEIGHT: 66 IN | HEART RATE: 71 BPM | OXYGEN SATURATION: 100 % | WEIGHT: 155 LBS

## 2023-07-21 DIAGNOSIS — J32.9 SINUSITIS, UNSPECIFIED CHRONICITY, UNSPECIFIED LOCATION: Primary | ICD-10-CM

## 2023-07-21 DIAGNOSIS — N83.201 RIGHT OVARIAN CYST: ICD-10-CM

## 2023-07-21 LAB
ALBUMIN SERPL-MCNC: 3.9 G/DL (ref 3.3–5.5)
ALBUMIN SERPL-MCNC: 3.9 G/DL (ref 3.3–5.5)
ALP SERPL-CCNC: 62 U/L (ref 42–141)
ALP SERPL-CCNC: 65 U/L (ref 42–141)
BILIRUB SERPL-MCNC: 0.6 MG/DL (ref 0.2–1.6)
BILIRUB SERPL-MCNC: 0.6 MG/DL (ref 0.2–1.6)
BILIRUBIN, POC UA: NEGATIVE
BLOOD, POC UA: ABNORMAL
BUN SERPL-MCNC: 10 MG/DL (ref 7–22)
CALCIUM SERPL-MCNC: 10 MG/DL (ref 8–10.3)
CHLORIDE SERPL-SCNC: 105 MMOL/L (ref 98–108)
CLARITY, POC UA: CLEAR
COLOR, POC UA: YELLOW
CREAT SERPL-MCNC: 0.7 MG/DL (ref 0.6–1.2)
GLUCOSE SERPL-MCNC: 106 MG/DL (ref 73–118)
GLUCOSE, POC UA: NEGATIVE
KETONES, POC UA: NEGATIVE
LEUKOCYTE EST, POC UA: ABNORMAL
NITRITE, POC UA: NEGATIVE
PH UR STRIP: 6 [PH]
POC ALT (SGPT): 15 U/L (ref 10–47)
POC ALT (SGPT): 17 U/L (ref 10–47)
POC AMYLASE: 47 U/L (ref 14–97)
POC AST (SGOT): 16 U/L (ref 11–38)
POC AST (SGOT): 19 U/L (ref 11–38)
POC GGT: 16 U/L (ref 5–65)
POC TCO2: 31 MMOL/L (ref 18–33)
POTASSIUM BLD-SCNC: 3.8 MMOL/L (ref 3.6–5.1)
PROTEIN, POC UA: NEGATIVE
PROTEIN, POC: 7.3 G/DL (ref 6.4–8.1)
PROTEIN, POC: 7.4 G/DL (ref 6.4–8.1)
SODIUM BLD-SCNC: 143 MMOL/L (ref 128–145)
SPECIFIC GRAVITY, POC UA: 1.01
UROBILINOGEN, POC UA: 0.2 E.U./DL

## 2023-07-21 PROCEDURE — 99284 EMERGENCY DEPT VISIT MOD MDM: CPT | Mod: 25,ER

## 2023-07-21 PROCEDURE — 82962 GLUCOSE BLOOD TEST: CPT | Mod: ER

## 2023-07-21 PROCEDURE — 80053 COMPREHEN METABOLIC PANEL: CPT | Mod: ER

## 2023-07-21 PROCEDURE — 82040 ASSAY OF SERUM ALBUMIN: CPT | Mod: 59,ER

## 2023-07-21 PROCEDURE — 82150 ASSAY OF AMYLASE: CPT | Mod: ER

## 2023-07-21 PROCEDURE — 25000003 PHARM REV CODE 250: Mod: ER | Performed by: INTERNAL MEDICINE

## 2023-07-21 RX ORDER — IBUPROFEN 400 MG/1
800 TABLET ORAL
Status: DISCONTINUED | OUTPATIENT
Start: 2023-07-21 | End: 2023-07-21

## 2023-07-21 RX ORDER — AZELASTINE 1 MG/ML
2 SPRAY, METERED NASAL 2 TIMES DAILY
Qty: 30 ML | Refills: 0 | Status: SHIPPED | OUTPATIENT
Start: 2023-07-21 | End: 2023-09-14

## 2023-07-21 RX ORDER — IBUPROFEN 800 MG/1
800 TABLET ORAL EVERY 8 HOURS PRN
Qty: 30 TABLET | Refills: 0 | Status: SHIPPED | OUTPATIENT
Start: 2023-07-21

## 2023-07-21 RX ORDER — FLUTICASONE PROPIONATE 50 MCG
2 SPRAY, SUSPENSION (ML) NASAL DAILY
Qty: 15 G | Refills: 0 | Status: SHIPPED | OUTPATIENT
Start: 2023-07-21

## 2023-07-21 RX ORDER — ACETAMINOPHEN 500 MG
1000 TABLET ORAL
Status: COMPLETED | OUTPATIENT
Start: 2023-07-22 | End: 2023-07-21

## 2023-07-21 RX ADMIN — ACETAMINOPHEN 1000 MG: 500 TABLET, FILM COATED ORAL at 11:07

## 2023-07-22 LAB — POCT GLUCOSE: 109 MG/DL (ref 70–110)

## 2023-07-22 NOTE — ED PROVIDER NOTES
Encounter Date: 2023    SCRIBE #1 NOTE: I, Deya Dominguez, am scribing for, and in the presence of,  Jim Samson MD. I have scribed the following portions of the note - Other sections scribed: HPI, ROS, PE.     History     Chief Complaint   Patient presents with    Abdominal Pain     C/o intermittent sharp abdominal pain in RLQ. Pt has nausea, denies vomiting. Pain 6/10. Reports taking Bentyl pta with minimal relief.     48 year old female with no known past medical history presenting to the Emergency room for further evaluation of sharp, intermittent RLQ abdominal pain with associated nausea, sinus pressure, and ear fullness. The patient describes pain as cramping and rates it 6/10. Denies use of Q-tips. Treatment of Benadryl PTA with minimal relief. No other exacerbating or alleviating factors. Denies vomiting or other symptoms. No further complaints at present time.     The history is provided by the patient. No  was used.   Review of patient's allergies indicates:   Allergen Reactions    Codeine Shortness Of Breath and Hives    Iodine Shortness Of Breath    Penicillins Shortness Of Breath and Anaphylaxis    Shellfish containing products Shortness Of Breath and Swelling    Latex Rash    Latex, natural rubber Rash     Past Medical History:   Diagnosis Date    Anxiety disorder, unspecified     Depression     Fibroids      Past Surgical History:   Procedure Laterality Date    TONSILLECTOMY       No family history on file.  Social History     Tobacco Use    Smoking status: Former     Types: Cigarettes     Quit date: 2/10/2019     Years since quittin.4    Smokeless tobacco: Never   Substance Use Topics    Alcohol use: Not Currently    Drug use: No     Review of Systems   Constitutional:  Negative for activity change, appetite change, chills, fatigue and fever.   HENT:  Positive for sinus pressure. Negative for congestion, sore throat, trouble swallowing and voice change.    Eyes:   Negative for photophobia, pain, redness and visual disturbance.   Respiratory:  Negative for cough, chest tightness, shortness of breath and wheezing.    Cardiovascular:  Negative for chest pain, palpitations and leg swelling.   Gastrointestinal:  Positive for abdominal pain and nausea. Negative for abdominal distention, anal bleeding, constipation, diarrhea and vomiting.   Endocrine: Negative for polydipsia, polyphagia and polyuria.   Genitourinary:  Negative for difficulty urinating, dysuria, frequency, hematuria, urgency, vaginal bleeding and vaginal discharge.   Musculoskeletal:  Negative for arthralgias and myalgias.   Skin:  Negative for rash and wound.   Neurological:  Negative for dizziness, weakness, light-headedness and headaches.   Hematological:  Negative for adenopathy.     Physical Exam     Initial Vitals [07/21/23 1901]   BP Pulse Resp Temp SpO2   (!) 139/97 88 18 98.2 °F (36.8 °C) 96 %      MAP       --         Physical Exam    Nursing note and vitals reviewed.  Constitutional: She appears well-developed and well-nourished.   HENT:   Head: Normocephalic and atraumatic.   Right Ear: External ear normal.   Left Ear: External ear normal.   Bilateral TM obscured with cerumen.    Eyes: Conjunctivae are normal.   Neck: Phonation normal. Neck supple.   Normal range of motion.  Cardiovascular:  Normal rate and intact distal pulses.           Pulmonary/Chest: Effort normal. No stridor. No respiratory distress.   Abdominal: Abdomen is soft. There is no abdominal tenderness.   Musculoskeletal:         General: No tenderness or edema.      Cervical back: Normal range of motion and neck supple.     Neurological: She is alert and oriented to person, place, and time.   Skin: Skin is warm and dry. No rash noted.   Psychiatric: She has a normal mood and affect. Her behavior is normal.       ED Course   Procedures  Labs Reviewed   POCT URINALYSIS W/O SCOPE - Abnormal; Notable for the following components:       Result  Value    Blood, UA 1+ (*)     Leukocytes, UA Trace (*)     All other components within normal limits   POCT URINALYSIS W/O SCOPE   POCT CBC   POCT CMP   POCT LIVER PANEL   POCT LIVER PANEL   POCT CMP          Imaging Results               US Pelvis Comp with Transvag NON-OB (xpd (Final result)  Result time 07/21/23 23:29:59      Final result by Kaitlyn Mike MD (07/21/23 23:29:59)                   Impression:      Nonspecific endometrial thickening measuring up to 14.2 cm could be related to hypertrophy, hyperplasia or possibly neoplasia with cystic changes along the endometrium.  No myometrial lesions.    Right adnexal/ovarian dominant follicle/small cysts.    Nabothian cyst.    Nonspecific trace fluid in the pelvis.    This report was flagged in Epic as abnormal.      Electronically signed by: Kaitlyn Mike  Date:    07/21/2023  Time:    23:29               Narrative:    EXAMINATION:  US PELVIS COMP WITH TRANSVAG NON-OB (XPD)    CLINICAL HISTORY:  Left pelvic pain; LMP stated to be in April 2023    TECHNIQUE:  Transabdominal sonography of the pelvis was performed, followed by transvaginal sonography to better evaluate the uterus and ovaries.    COMPARISON:  04/15/2022 is ultrasound pelvis    FINDINGS:  Uterus:    Size: 9.8 x 5.5 x 6.1 cm    Masses: None    Endometrium: Thickened in this pre menopausal patient, measuring 14.2 mm the with multiple cystic areas along the mucosa endometrium.  Multiple nabothian cysts are seen in the cervix.  The    Right ovary:    Size: 2.9 x 1.6 x 2.1 cm    Appearance: Multiple small follicles seen.  There is a 1 x 0.8 x 1.2 cm cyst abutting the right ovary.    Vascular flow: Normal.    Left ovary:    Size: 3.2 x 1 x 2.0 cm    Appearance: There is a 1.5 x 1.1 x 1.3 cm dominant follicle/small cyst.  Normal smaller follicles also noted.    Vascular Flow: Normal.    Free Fluid:    There is trace free fluid..                                       Medications   acetaminophen  tablet 1,000 mg (1,000 mg Oral Given 7/21/23 4851)     Medical Decision Making:   History:   Old Medical Records: I decided to obtain old medical records.  Initial Assessment:   48 year old female with no known past medical history presenting to the Emergency room for further evaluation of sharp, intermittent RLQ abdominal pain with associated nausea, sinus pressure, and ear fullness. The patient describes pain as cramping and rates it 6/10. Denies use of Q-tips. Treatment of Benadryl PTA with minimal relief. No other exacerbating or alleviating factors. Denies vomiting or other symptoms. No further complaints at present time.   Clinical Tests:   Lab Tests: Ordered and Reviewed  Radiological Study: Ordered and Reviewed  ED Management:  CBC and CMP are reassuring.  Urinalysis shows no obvious signs of infection.  Pelvic ultrasound shows for right ovarian cyst.  Patient has denied pain during physical exam, but complained of mild pain later in ED stay and was given Tylenol .  She states she is pain-free prior to discharge and was given instructions to follow with her gynecologist within the next week for re-evaluation/return to the emergency department if condition worsens.  Instructions for where cyst were given prior to discharge.      This document was produced by a scribe under my direction and in my presence. I agree with the content of the note and have made any necessary edits.     Dr. Samson    07/22/2023 4:16 AM        Scribe Attestation:   Scribe #1: I performed the above scribed service and the documentation accurately describes the services I performed. I attest to the accuracy of the note.                   Clinical Impression:   Final diagnoses:  [J32.9] Sinusitis, unspecified chronicity, unspecified location (Primary)  [N83.201] Right ovarian cyst        ED Disposition Condition    Discharge Stable          ED Prescriptions       Medication Sig Dispense Start Date End Date Auth. Provider    azelastine  (ASTELIN) 137 mcg (0.1 %) nasal spray 2 sprays (274 mcg total) by Nasal route 2 (two) times daily. 30 mL 7/21/2023 9/14/2023 Jim Samson MD    fluticasone propionate (FLONASE) 50 mcg/actuation nasal spray 2 sprays (100 mcg total) by Each Nostril route once daily. 15 g 7/21/2023 -- Jim Samson MD    ibuprofen (ADVIL,MOTRIN) 800 MG tablet Take 1 tablet (800 mg total) by mouth every 8 (eight) hours as needed for Pain. 30 tablet 7/21/2023 -- Jim Samson MD          Follow-up Information       Follow up With Specialties Details Why Contact Info    Juan Saldivar MD Family Medicine Schedule an appointment as soon as possible for a visit in 1 week For reevaluation [address] [facility]  [place] [id number]  [phone]               Jim Samson MD  07/22/23 0411

## 2023-09-18 PROBLEM — Z13.9 ENCOUNTER FOR MEDICAL SCREENING EXAMINATION: Status: RESOLVED | Noted: 2023-06-18 | Resolved: 2023-09-18

## 2023-10-18 ENCOUNTER — HOSPITAL ENCOUNTER (EMERGENCY)
Facility: HOSPITAL | Age: 48
Discharge: HOME | End: 2023-10-18
Attending: EMERGENCY MEDICINE
Payer: MEDICAID

## 2023-10-18 VITALS
DIASTOLIC BLOOD PRESSURE: 81 MMHG | WEIGHT: 160 LBS | OXYGEN SATURATION: 100 % | RESPIRATION RATE: 18 BRPM | TEMPERATURE: 98 F | SYSTOLIC BLOOD PRESSURE: 116 MMHG | HEART RATE: 90 BPM | HEIGHT: 66 IN | BODY MASS INDEX: 25.71 KG/M2

## 2023-10-18 DIAGNOSIS — M54.16 LUMBAR RADICULOPATHY: ICD-10-CM

## 2023-10-18 DIAGNOSIS — J06.9 UPPER RESPIRATORY TRACT INFECTION, UNSPECIFIED TYPE: ICD-10-CM

## 2023-10-18 DIAGNOSIS — R35.0 URINARY FREQUENCY: Primary | ICD-10-CM

## 2023-10-18 LAB
B-HCG UR QL: NEGATIVE
BILIRUBIN, POC UA: NEGATIVE
BLOOD, POC UA: ABNORMAL
CLARITY, POC UA: CLEAR
COLOR, POC UA: YELLOW
CTP QC/QA: YES
CTP QC/QA: YES
GLUCOSE, POC UA: NEGATIVE
INFLUENZA A ANTIGEN, POC: NEGATIVE
INFLUENZA B ANTIGEN, POC: NEGATIVE
KETONES, POC UA: NEGATIVE
LEUKOCYTE EST, POC UA: NEGATIVE
NITRITE, POC UA: NEGATIVE
PH UR STRIP: 5.5 [PH]
PROTEIN, POC UA: NEGATIVE
SARS-COV-2 RDRP RESP QL NAA+PROBE: NEGATIVE
SPECIFIC GRAVITY, POC UA: 1.01
UROBILINOGEN, POC UA: 0.2 E.U./DL

## 2023-10-18 PROCEDURE — 87635 SARS-COV-2 COVID-19 AMP PRB: CPT | Mod: ER | Performed by: EMERGENCY MEDICINE

## 2023-10-18 PROCEDURE — 87804 INFLUENZA ASSAY W/OPTIC: CPT | Mod: ER

## 2023-10-18 PROCEDURE — 81025 URINE PREGNANCY TEST: CPT | Mod: ER

## 2023-10-18 PROCEDURE — 25000003 PHARM REV CODE 250: Mod: ER | Performed by: EMERGENCY MEDICINE

## 2023-10-18 PROCEDURE — 87086 URINE CULTURE/COLONY COUNT: CPT | Performed by: EMERGENCY MEDICINE

## 2023-10-18 PROCEDURE — 99284 EMERGENCY DEPT VISIT MOD MDM: CPT | Mod: ER

## 2023-10-18 PROCEDURE — 63600175 PHARM REV CODE 636 W HCPCS: Mod: ER | Performed by: EMERGENCY MEDICINE

## 2023-10-18 PROCEDURE — 81025 URINE PREGNANCY TEST: CPT | Mod: ER | Performed by: NURSE PRACTITIONER

## 2023-10-18 PROCEDURE — 96372 THER/PROPH/DIAG INJ SC/IM: CPT | Performed by: EMERGENCY MEDICINE

## 2023-10-18 PROCEDURE — 81003 URINALYSIS AUTO W/O SCOPE: CPT | Mod: ER

## 2023-10-18 RX ORDER — DEXAMETHASONE SODIUM PHOSPHATE 4 MG/ML
12 INJECTION, SOLUTION INTRA-ARTICULAR; INTRALESIONAL; INTRAMUSCULAR; INTRAVENOUS; SOFT TISSUE
Status: COMPLETED | OUTPATIENT
Start: 2023-10-18 | End: 2023-10-18

## 2023-10-18 RX ORDER — ACETAMINOPHEN 500 MG
1000 TABLET ORAL
Status: COMPLETED | OUTPATIENT
Start: 2023-10-18 | End: 2023-10-18

## 2023-10-18 RX ORDER — SULFAMETHOXAZOLE AND TRIMETHOPRIM 800; 160 MG/1; MG/1
1 TABLET ORAL 2 TIMES DAILY
Qty: 14 TABLET | Refills: 0 | Status: SHIPPED | OUTPATIENT
Start: 2023-10-18 | End: 2023-10-25

## 2023-10-18 RX ADMIN — DEXAMETHASONE SODIUM PHOSPHATE 12 MG: 4 INJECTION, SOLUTION INTRA-ARTICULAR; INTRALESIONAL; INTRAMUSCULAR; INTRAVENOUS; SOFT TISSUE at 02:10

## 2023-10-18 RX ADMIN — ACETAMINOPHEN 1000 MG: 500 TABLET ORAL at 03:10

## 2023-10-18 NOTE — ED PROVIDER NOTES
Encounter Date: 10/18/2023    SCRIBE #1 NOTE: I, Marnie Charles, am scribing for, and in the presence of,  Bryce Beasley MD. I have scribed the following portions of the note - Other sections scribed: HPI, ROS, PE.       History     Chief Complaint   Patient presents with    Hematuria     A 47 y/o female presents to the ER with concerned of possible UTI. Pt reports seeing a trace of blood in urine. Last menstrual cycle Saturday. Also reports having chronic back pain and experiencing discomfort in lower back. Denies fever and dysuria.      Milagro Gomez is a 48 y.o. female with PMHx of low back arthritis, DDD, and anxiety who presents to the ED with vaginal bleeding (pink) yesterday. Patient reports increased urinary frequency and urgency, but states that may be due to increased water intake. LMP ended 10/14/2023. Further reports intermittent anterior bilateral thighs. Describes thigh pain as burning, itching sensation. Additionally reports sore throat and cough for a few days and a sinus headache this AM. Endorses compliance with 100 mg gabapentin with no relief of pain. Patient denies EtOH, cigarette, or illicit drug use. Not concerned for STD exposure. Denies HRT. Reports uterus and cervix biopsy recently with no significant findings reported to her. No other exacerbating or alleviating factors. Denies lower abdominal pain, pelvic pain, increased back pain, flank pain, dysuria, hematuria, rash, numbness, tingling, swelling, weakness or other associated symptoms.       The history is provided by the patient. No  was used.     Review of patient's allergies indicates:   Allergen Reactions    Codeine Shortness Of Breath and Hives    Iodine Shortness Of Breath    Penicillins Shortness Of Breath and Anaphylaxis    Shellfish containing products Shortness Of Breath and Swelling    Latex Rash    Latex, natural rubber Rash     Past Medical History:   Diagnosis Date    Anxiety disorder,  unspecified     Depression     Fibroids      Past Surgical History:   Procedure Laterality Date    TONSILLECTOMY       History reviewed. No pertinent family history.  Social History     Tobacco Use    Smoking status: Former     Current packs/day: 0.00     Types: Cigarettes     Quit date: 2/10/2019     Years since quittin.7    Smokeless tobacco: Never   Substance Use Topics    Alcohol use: Not Currently    Drug use: No     Review of Systems   Constitutional:  Negative for fever.   HENT:  Positive for sore throat.    Eyes:  Negative for visual disturbance.   Respiratory:  Positive for cough. Negative for shortness of breath.    Cardiovascular:  Negative for chest pain.   Gastrointestinal:  Positive for abdominal pain.   Genitourinary:  Positive for frequency, urgency and vaginal bleeding. Negative for dysuria, flank pain, hematuria and pelvic pain.   Musculoskeletal:  Positive for myalgias (bilateral thighs). Negative for back pain and joint swelling.   Skin:  Negative for rash.   Neurological:  Positive for headaches. Negative for weakness and numbness.        (-) paresthesia       Physical Exam     Initial Vitals [10/18/23 1318]   BP Pulse Resp Temp SpO2   116/81 90 18 98.3 °F (36.8 °C) 100 %      MAP       --         Physical Exam    Nursing note and vitals reviewed.  Constitutional: She appears well-developed and well-nourished.   HENT:   Head: Normocephalic and atraumatic.   Eyes: EOM are normal. Pupils are equal, round, and reactive to light.   Neck: Neck supple. No thyromegaly present. No JVD present.   Normal range of motion.  Cardiovascular:  Normal rate and regular rhythm.     Exam reveals no gallop and no friction rub.       No murmur heard.  Pulmonary/Chest: Breath sounds normal. No respiratory distress.   Abdominal: Abdomen is soft. Bowel sounds are normal. There is no abdominal tenderness.   No right CVA tenderness.  No left CVA tenderness.   Musculoskeletal:         General: No tenderness or edema.  Normal range of motion.      Cervical back: Normal range of motion and neck supple.     Neurological: She is alert and oriented to person, place, and time. She has normal strength. GCS score is 15. GCS eye subscore is 4. GCS verbal subscore is 5. GCS motor subscore is 6.   Skin: Skin is warm and dry. Capillary refill takes less than 2 seconds.         ED Course   Procedures  Labs Reviewed   POCT URINALYSIS W/O SCOPE - Abnormal; Notable for the following components:       Result Value    Blood, UA 1+ (*)     All other components within normal limits   CULTURE, URINE    Narrative:     Indicated criteria for high risk culture:->Other  Other (specify):->Dysuria   POCT URINE PREGNANCY   POCT URINALYSIS W/O SCOPE   SARS-COV-2 RDRP GENE    Narrative:     This test utilizes isothermal nucleic acid amplification technology to detect the SARS-CoV-2 RdRp nucleic acid segment. The analytical sensitivity (limit of detection) is 500 copies/swab.     A POSITIVE result is indicative of the presence of SARS-CoV-2 RNA; clinical correlation with patient history and other diagnostic information is necessary to determine patient infection status.    A NEGATIVE result means that SARS-CoV-2 nucleic acids are not present above the limit of detection. A NEGATIVE result should be treated as presumptive. It does not rule out the possibility of COVID-19 and should not be the sole basis for treatment decisions. If COVID-19 is strongly suspected based on clinical and exposure history, re-testing using an alternate molecular assay should be considered.     This test is only for use under the Food and Drug Administration s Emergency Use Authorization (EUA).     Commercial kits are provided by [employer]. Performance characteristics of the EUA have been independently verified by [facility] Department of Pathology and Laboratory Medicine.   _________________________________________________________________   The authorized Fact  Sheet for Healthcare Providers and the authorized Fact Sheet for Patients of the ID NOW COVID-19 are available on the FDA website:    https://www.fda.gov/media/283203/download      https://www.fda.gov/media/073634/download      POCT RAPID INFLUENZA A/B          Imaging Results    None          Medications   dexAMETHasone injection 12 mg (12 mg Intramuscular Given 10/18/23 1433)   acetaminophen tablet 1,000 mg (1,000 mg Oral Given 10/18/23 1523)     Medical Decision Making  This is an emergent evaluation of a 48 y.o. female who presents with vaginal bleeding, frequency, urgency yesterday and pre-existing bilateral thigh pain, sore throat, cough and headache. The patient was seen and examined. The history and physical exam was obtained. The nursing notes and vital signs were reviewed. Secondary to symptoms and examination findings, I ordered UA.      Amount and/or Complexity of Data Reviewed  Labs: ordered.    Risk  OTC drugs.  Prescription drug management.            Scribe Attestation:   Scribe #1: I performed the above scribed service and the documentation accurately describes the services I performed. I attest to the accuracy of the note.                      I, Bryce Beasley, personally performed the services described in this documentation.  All medical record entries made by the scribe were at my direction and in my presence.  I have reviewed the chart and agree that the record reflects my personal performance and is accurate and complete.    Clinical Impression:   Final diagnoses:  [R35.0] Urinary frequency (Primary)  [M54.16] Lumbar radiculopathy  [J06.9] Upper respiratory tract infection, unspecified type        ED Disposition Condition    Discharge Stable          ED Prescriptions       Medication Sig Dispense Start Date End Date Auth. Provider    sulfamethoxazole-trimethoprim 800-160mg (BACTRIM DS) 800-160 mg Tab () Take 1 tablet by mouth 2 (two) times daily. for 7 days 14 tablet 10/18/2023  10/25/2023 Bryce Beasley MD    sulfamethoxazole-trimethoprim 800-160mg (BACTRIM DS) 800-160 mg Tab () Take 1 tablet by mouth 2 (two) times daily. for 7 days 14 tablet 10/18/2023 10/25/2023 Bryce Beasley MD          Follow-up Information       Follow up With Specialties Details Why Contact Info    Juan Saldivar MD Family Medicine Schedule an appointment as soon as possible for a visit  for recheck. [address] [facility] [id number] [phone]      [facility] ED Emergency Medicine  If symptoms worsen [address] [facility] [address] [phone]             Bryce Beasley MD  [phone]

## 2023-10-20 LAB — BACTERIA UR CULT: NORMAL

## 2024-02-07 ENCOUNTER — HOSPITAL ENCOUNTER (EMERGENCY)
Facility: HOSPITAL | Age: 49
Discharge: HOME | End: 2024-02-07
Attending: EMERGENCY MEDICINE
Payer: MEDICAID

## 2024-02-07 VITALS
SYSTOLIC BLOOD PRESSURE: 140 MMHG | BODY MASS INDEX: 25.82 KG/M2 | RESPIRATION RATE: 17 BRPM | DIASTOLIC BLOOD PRESSURE: 68 MMHG | HEART RATE: 84 BPM | WEIGHT: 160 LBS | TEMPERATURE: 98 F | OXYGEN SATURATION: 97 %

## 2024-02-07 DIAGNOSIS — L02.91 ABSCESS: ICD-10-CM

## 2024-02-07 DIAGNOSIS — R05.9 COUGH: ICD-10-CM

## 2024-02-07 DIAGNOSIS — B34.9 VIRAL SYNDROME: Primary | ICD-10-CM

## 2024-02-07 PROCEDURE — 87804 INFLUENZA ASSAY W/OPTIC: CPT | Mod: 59,ER

## 2024-02-07 PROCEDURE — 87635 SARS-COV-2 COVID-19 AMP PRB: CPT | Mod: ER | Performed by: EMERGENCY MEDICINE

## 2024-02-07 PROCEDURE — 99284 EMERGENCY DEPT VISIT MOD MDM: CPT | Mod: 25,ER

## 2024-02-07 PROCEDURE — 81025 URINE PREGNANCY TEST: CPT | Mod: ER

## 2024-02-07 PROCEDURE — 87880 STREP A ASSAY W/OPTIC: CPT | Mod: ER

## 2024-02-07 PROCEDURE — 81025 URINE PREGNANCY TEST: CPT | Mod: ER | Performed by: EMERGENCY MEDICINE

## 2024-02-07 RX ORDER — MUPIROCIN 20 MG/G
OINTMENT TOPICAL 3 TIMES DAILY
Qty: 15 G | Refills: 0 | Status: SHIPPED | OUTPATIENT
Start: 2024-02-07

## 2024-02-07 RX ORDER — GUAIFENESIN 100 MG/5ML
100-200 SOLUTION ORAL EVERY 4 HOURS PRN
Qty: 60 ML | Refills: 0 | Status: SHIPPED | OUTPATIENT
Start: 2024-02-07 | End: 2024-02-17

## 2024-02-07 RX ORDER — DOXYCYCLINE 100 MG/1
100 CAPSULE ORAL 2 TIMES DAILY
Qty: 14 CAPSULE | Refills: 0 | Status: SHIPPED | OUTPATIENT
Start: 2024-02-07 | End: 2024-02-14

## 2024-02-07 RX ORDER — BENZONATATE 100 MG/1
100 CAPSULE ORAL 3 TIMES DAILY PRN
Qty: 20 CAPSULE | Refills: 0 | Status: SHIPPED | OUTPATIENT
Start: 2024-02-07 | End: 2024-02-17

## 2024-02-07 RX ORDER — FLUTICASONE PROPIONATE 50 MCG
1 SPRAY, SUSPENSION (ML) NASAL 2 TIMES DAILY PRN
Qty: 15 G | Refills: 0 | Status: SHIPPED | OUTPATIENT
Start: 2024-02-07

## 2024-02-07 RX ORDER — CETIRIZINE HYDROCHLORIDE 10 MG/1
10 TABLET ORAL DAILY
Qty: 30 TABLET | Refills: 0 | Status: SHIPPED | OUTPATIENT
Start: 2024-02-07 | End: 2024-03-08

## 2024-02-07 NOTE — DISCHARGE INSTRUCTIONS
You were diagnosed with abscess and viral upper respiratory tract infection here in the ER today. Please take all medications as prescribed for symptoms and follow up with your primary care provider. Return to the ER for any new or worsening symptoms. It was a pleasure taking care of you today, I hope you feel better!    Thank you for coming to our Emergency Department today. It is important to remember that some problems are difficult to diagnose and may not be found during your Emergency Department visit. Be sure to follow up with your primary care doctor and review all labs/imaging/tests that were performed during this visit with them. Some labs/tests may be outside of the normal range and require non-emergent follow-up and further investigation to help diagnose/exclude/prevent complications or other medical conditions.    If you do not have a primary care doctor, you may contact the one listed on your discharge paperwork or you may also call the [facility] Appointment Desk at [phone] to schedule an appointment and establish care with one. It is important to your health that you have a primary care doctor.    Please take all medications as directed. All medications may potentially have side-effects and it is impossible to predict which medications may give you side-effects or what side-effects (if any) they will give you.. If you feel that you are having a negative effect or side-effect of any medication you should immediately stop taking them and seek medical attention. If you feel that you are having a life-threatening reaction call 911.    Return to the ER with any questions/concerns, new/concerning symptoms, worsening or failure to improve.     Do not drive, swim, climb to height, take a bath or make any important decisions for 24 hours if you have received any pain medications, sedatives or mood altering drugs during your ER visit.

## 2024-02-07 NOTE — ED PROVIDER NOTES
Encounter Date: 2024       History     Chief Complaint   Patient presents with    Influenza     Pt reports she began with headache, congestion and cough x 3 days  Also reports a bump under her right arm. Denies drainage   Denies fever      Patient is a 48 y.o. female with a past medical history of anxiety, depression who presents to the emergency department for evaluation of cough, congestion x 3 days.  Reports chest discomfort with coughing.  Reports abscess to the right axilla.  Denies drainage.  Denies recently shaving in this area.  Denies history of immunodeficiency.  Denies fever, chills.  Denies nausea or vomiting.  Denies taking any medications for symptoms.  Denies headache, chest pain, shortness on breath, abdominal pain.  Denies difficulty swallowing, rhinorrhea, otalgia.      Review of patient's allergies indicates:   Allergen Reactions    Codeine Shortness Of Breath and Hives    Iodine Shortness Of Breath    Penicillins Shortness Of Breath and Anaphylaxis    Shellfish containing products Shortness Of Breath and Swelling    Latex Rash    Latex, natural rubber Rash     Past Medical History:   Diagnosis Date    Anxiety disorder, unspecified     Depression     Fibroids      Past Surgical History:   Procedure Laterality Date    TONSILLECTOMY       History reviewed. No pertinent family history.  Social History     Tobacco Use    Smoking status: Former     Current packs/day: 0.00     Types: Cigarettes     Quit date: 2/10/2019     Years since quittin.9    Smokeless tobacco: Never   Substance Use Topics    Alcohol use: Not Currently    Drug use: No     Review of Systems   Constitutional:  Negative for chills and fever.   HENT:  Negative for congestion, ear pain, rhinorrhea, sore throat and trouble swallowing.    Eyes:  Negative for photophobia.   Respiratory:  Positive for cough. Negative for shortness of breath.    Cardiovascular:  Negative for chest pain.   Gastrointestinal:  Negative for abdominal  pain, diarrhea, nausea and vomiting.   Genitourinary:  Negative for dysuria.   Musculoskeletal:  Negative for back pain, neck pain and neck stiffness.   Skin:  Positive for wound. Negative for rash.   Neurological:  Negative for dizziness, weakness, numbness and headaches.   Hematological:  Does not bruise/bleed easily.       Physical Exam     Initial Vitals [02/07/24 1251]   BP Pulse Resp Temp SpO2   (!) 154/57 86 20 98.1 °F (36.7 °C) 100 %      MAP       --         Physical Exam    Nursing note and vitals reviewed.  Constitutional: She appears well-developed and well-nourished.   HENT:   Head: Normocephalic and atraumatic.   Right Ear: External ear normal.   Left Ear: External ear normal.   There is mild posterior oropharyngeal erythema with postnasal drip, no tonsillar swelling, no oropharyngeal exudates, uvula is midline. Normal dentition. No trismus.  No muffled voice. No submandibular swelling. Patient is tolerating secretions without difficulty.  Patient is speaking in full sentences on exam without difficulty.  Bilateral tympanic membranes are pearly gray without erythema, bulging, perforation.  There is no postauricular swelling, or overlying erythema or tenderness to palpation over mastoids bilaterally.    Neck: Carotid bruit is not present.   Normal range of motion.  Cardiovascular:  Normal rate, regular rhythm, normal heart sounds and intact distal pulses.     Exam reveals no gallop and no friction rub.       No murmur heard.  Pulmonary/Chest: Breath sounds normal. No respiratory distress. She has no wheezes. She has no rhonchi. She has no rales.   Abdominal: Abdomen is soft. Bowel sounds are normal. She exhibits no distension. There is no abdominal tenderness. There is no rebound and no guarding.   Musculoskeletal:         General: Normal range of motion.      Cervical back: Normal range of motion.     Neurological: She is alert and oriented to person, place, and time. GCS score is 15. GCS eye subscore  is 4. GCS verbal subscore is 5. GCS motor subscore is 6.   Skin:   There is an approximately 1 cm area of swelling to the right axilla with surrounding erythema.  There is induration but no fluctuance.   Psychiatric: She has a normal mood and affect.         ED Course   Procedures  Labs Reviewed   SARS-COV-2 RDRP GENE    Narrative:     This test utilizes isothermal nucleic acid amplification technology to detect the SARS-CoV-2 RdRp nucleic acid segment. The analytical sensitivity (limit of detection) is 500 copies/swab.     A POSITIVE result is indicative of the presence of SARS-CoV-2 RNA; clinical correlation with patient history and other diagnostic information is necessary to determine patient infection status.    A NEGATIVE result means that SARS-CoV-2 nucleic acids are not present above the limit of detection. A NEGATIVE result should be treated as presumptive. It does not rule out the possibility of COVID-19 and should not be the sole basis for treatment decisions. If COVID-19 is strongly suspected based on clinical and exposure history, re-testing using an alternate molecular assay should be considered.     Commercial kits are provided by [employer].   _________________________________________________________________   The authorized Fact Sheet for Healthcare Providers and the authorized Fact Sheet for Patients of the ID NOW COVID-19 are available on the FDA website:    https://www.fda.gov/media/979614/download      https://www.fda.gov/media/966474/download      POCT URINE PREGNANCY   POCT INFLUENZA A/B MOLECULAR   POCT STREP A MOLECULAR   POCT STREP A, RAPID   POCT RAPID INFLUENZA A/B          Imaging Results              X-Ray Chest PA And Lateral (Final result)  Result time 02/07/24 17:23:21      Final result by Jareth Ott MD (02/07/24 17:23:21)                   Impression:      No acute cardiopulmonary process identified.      Electronically signed by: Jareth Ott  MD  Date:    02/07/2024  Time:    17:23               Narrative:    EXAMINATION:  XR CHEST PA AND LATERAL    CLINICAL HISTORY:  Cough, unspecified    TECHNIQUE:  PA and lateral views of the chest were performed.    COMPARISON:  None    FINDINGS:  Cardiac silhouette is normal in size.  Lungs are symmetrically expanded.  No evidence of focal consolidative process, pneumothorax, or significant pleural effusion.  No acute osseous abnormality identified.                                       Medications - No data to display  Medical Decision Making  This is an emergent evaluation of a 48-year-old female with a past medical history of anxiety, depression who presents to the emergency department for URI symptoms and abscess to the right axilla.  On physical exam, patient is well-appearing and in no acute distress. There is mild posterior oropharyngeal erythema with postnasal drip, no tonsillar swelling, no oropharyngeal exudates, uvula is midline. Normal dentition. No trismus.  No muffled voice. No submandibular swelling. Patient is tolerating secretions without difficulty.  Patient is speaking in full sentences on exam without difficulty.  Bilateral tympanic membranes are pearly gray without erythema, bulging, perforation.  There is no postauricular swelling, or overlying erythema or tenderness to palpation over mastoids bilaterally. There is an approximately 1 cm area of swelling to the right axilla with surrounding erythema.  There is induration but no fluctuance. Regular rate rhythm without murmurs.  No carotid bruits appreciated on exam. Lungs are clear to auscultation bilaterally.  Abdomen is soft, nontender, non distended, with normal bowel sounds.  Differential diagnosis includes but is not limited to COVID, flu, strep, pneumonia, bronchitis, abscess, cellulitis.  Workup initiated with viral swabs, chest x-ray.  Viral swabs negative.  Chest x-ray showed no acute abnormalities, no focal consolidation with a  pneumonia.  My overall impression is viral URI and abscess with cellulitis.  No incision and drainage needed at this time given physical exam findings.  We will discharge home with symptomatic medications and a course of doxycycline/mupirocin.  Instructed patient to return to the emergency department for any new or worsening symptoms. Patient is very well appearing, and in no acute distress. Vital signs are reassuring here in the emergency department, patient is afebrile, breathing comfortable, satting 100 % on room air. Patient/Caregiver is stable for discharge at this time. Patient/Caregiver verbalize understanding of care plan. All questions and concerns were addressed. Discussed strict return precautions with the patient/caregiver. Instructed follow up with primary care provider within 1 week.      Denver Michel PA-C    DISCLAIMER: This note was prepared with [employer] voice recognition transcription software. Garbled syntax, mangled pronouns, and other bizarre constructions may be attributed to that software system.     Amount and/or Complexity of Data Reviewed  Labs: ordered.  Radiology: ordered.    Risk  OTC drugs.  Prescription drug management.                                      Clinical Impression:  Final diagnoses:  [R05.9] Cough  [B34.9] Viral syndrome (Primary)  [L02.91] Abscess          ED Disposition Condition    Discharge Stable          ED Prescriptions       Medication Sig Dispense Start Date End Date Auth. Provider    doxycycline (VIBRAMYCIN) 100 MG Cap Take 1 capsule (100 mg total) by mouth 2 (two) times daily. for 7 days 14 capsule 2/7/2024 2/14/2024 Denver Michel PA-C    mupirocin (BACTROBAN) 2 % ointment Apply topically 3 (three) times daily. 15 g 2/7/2024 -- Denver Michel PA-C    benzonatate (TESSALON) 100 MG capsule Take 1 capsule (100 mg total) by mouth 3 (three) times daily as needed. 20 capsule 2/7/2024 2/17/2024 Denver Michel PA-C    guaiFENesin 100 mg/5 ml (ROBITUSSIN) 100  mg/5 mL syrup Take 5-10 mLs (100-200 mg total) by mouth every 4 (four) hours as needed for Cough. 60 mL 2/7/2024 2/17/2024 Denver Michel PA-C    cetirizine (ZYRTEC) 10 MG tablet Take 1 tablet (10 mg total) by mouth once daily. 30 tablet 2/7/2024 3/8/2024 Denver Michel PA-C    fluticasone propionate (FLONASE) 50 mcg/actuation nasal spray 1 spray (50 mcg total) by Each Nostril route 2 (two) times daily as needed for Rhinitis. 15 g 2/7/2024 -- Denver Michel PA-C          Follow-up Information       Follow up With Specialties Details Why Contact Info    Juan Saldivar MD Family Medicine   [address] [facility] [address]  [phone]      [facility] ED Emergency Medicine Go to  As needed, If symptoms worsen, or new symptoms develop [address] [facility] [address] [phone]             Denver Michel PA-C  02/07/24 3055

## 2024-02-07 NOTE — Clinical Note
"Milagro"Pascual Gomez was seen and treated in our emergency department on 2/7/2024.  She may return to work on 02/09/2024.       If you have any questions or concerns, please don't hesitate to call.      Denver Michel PA-C"